# Patient Record
Sex: FEMALE | Employment: OTHER | URBAN - METROPOLITAN AREA
[De-identification: names, ages, dates, MRNs, and addresses within clinical notes are randomized per-mention and may not be internally consistent; named-entity substitution may affect disease eponyms.]

---

## 2017-06-24 ENCOUNTER — HOSPITAL ENCOUNTER (EMERGENCY)
Facility: CLINIC | Age: 62
Discharge: HOME | End: 2017-06-24
Attending: EMERGENCY MEDICINE | Admitting: EMERGENCY MEDICINE
Payer: MEDICARE

## 2017-06-24 ENCOUNTER — APPOINTMENT (OUTPATIENT)
Dept: GENERAL RADIOLOGY | Facility: CLINIC | Age: 62
End: 2017-06-24
Attending: EMERGENCY MEDICINE
Payer: MEDICARE

## 2017-06-24 VITALS
HEART RATE: 64 BPM | RESPIRATION RATE: 16 BRPM | OXYGEN SATURATION: 95 % | BODY MASS INDEX: 43.4 KG/M2 | WEIGHT: 293 LBS | SYSTOLIC BLOOD PRESSURE: 119 MMHG | HEIGHT: 69 IN | TEMPERATURE: 98.6 F | DIASTOLIC BLOOD PRESSURE: 93 MMHG

## 2017-06-24 DIAGNOSIS — R06.02 SOB (SHORTNESS OF BREATH): ICD-10-CM

## 2017-06-24 LAB
ALBUMIN SERPL-MCNC: 3.1 G/DL (ref 3.4–5)
ALP SERPL-CCNC: 97 U/L (ref 40–150)
ALT SERPL W P-5'-P-CCNC: 23 U/L (ref 0–50)
ANION GAP SERPL CALCULATED.3IONS-SCNC: 5 MMOL/L (ref 3–14)
AST SERPL W P-5'-P-CCNC: 11 U/L (ref 0–45)
BASOPHILS # BLD AUTO: 0 10E9/L (ref 0–0.2)
BASOPHILS NFR BLD AUTO: 0.2 %
BILIRUB SERPL-MCNC: 0.3 MG/DL (ref 0.2–1.3)
BUN SERPL-MCNC: 22 MG/DL (ref 7–30)
CALCIUM SERPL-MCNC: 8.8 MG/DL (ref 8.5–10.1)
CHLORIDE SERPL-SCNC: 108 MMOL/L (ref 94–109)
CO2 SERPL-SCNC: 25 MMOL/L (ref 20–32)
CREAT SERPL-MCNC: 1.28 MG/DL (ref 0.52–1.04)
D DIMER PPP FEU-MCNC: 0.4 UG/ML FEU (ref 0–0.5)
DIFFERENTIAL METHOD BLD: ABNORMAL
EOSINOPHIL # BLD AUTO: 0.2 10E9/L (ref 0–0.7)
EOSINOPHIL NFR BLD AUTO: 2.2 %
ERYTHROCYTE [DISTWIDTH] IN BLOOD BY AUTOMATED COUNT: 15.3 % (ref 10–15)
GFR SERPL CREATININE-BSD FRML MDRD: 42 ML/MIN/1.7M2
GLUCOSE SERPL-MCNC: 80 MG/DL (ref 70–99)
HCT VFR BLD AUTO: 36.9 % (ref 35–47)
HGB BLD-MCNC: 12.1 G/DL (ref 11.7–15.7)
IMM GRANULOCYTES # BLD: 0.2 10E9/L (ref 0–0.4)
IMM GRANULOCYTES NFR BLD: 2.1 %
LIPASE SERPL-CCNC: 160 U/L (ref 73–393)
LYMPHOCYTES # BLD AUTO: 1.8 10E9/L (ref 0.8–5.3)
LYMPHOCYTES NFR BLD AUTO: 21.9 %
MCH RBC QN AUTO: 32.2 PG (ref 26.5–33)
MCHC RBC AUTO-ENTMCNC: 32.8 G/DL (ref 31.5–36.5)
MCV RBC AUTO: 98 FL (ref 78–100)
MONOCYTES # BLD AUTO: 0.8 10E9/L (ref 0–1.3)
MONOCYTES NFR BLD AUTO: 9.8 %
NEUTROPHILS # BLD AUTO: 5.2 10E9/L (ref 1.6–8.3)
NEUTROPHILS NFR BLD AUTO: 63.8 %
NRBC # BLD AUTO: 0 10*3/UL
NRBC BLD AUTO-RTO: 1 /100
PLATELET # BLD AUTO: 231 10E9/L (ref 150–450)
POTASSIUM SERPL-SCNC: 4.5 MMOL/L (ref 3.4–5.3)
PROT SERPL-MCNC: 7.3 G/DL (ref 6.8–8.8)
RBC # BLD AUTO: 3.76 10E12/L (ref 3.8–5.2)
SODIUM SERPL-SCNC: 138 MMOL/L (ref 133–144)
TROPONIN I SERPL-MCNC: NORMAL UG/L (ref 0–0.04)
WBC # BLD AUTO: 8.1 10E9/L (ref 4–11)

## 2017-06-24 PROCEDURE — 71020 XR CHEST 2 VW: CPT

## 2017-06-24 PROCEDURE — 84484 ASSAY OF TROPONIN QUANT: CPT | Performed by: EMERGENCY MEDICINE

## 2017-06-24 PROCEDURE — 85379 FIBRIN DEGRADATION QUANT: CPT | Performed by: EMERGENCY MEDICINE

## 2017-06-24 PROCEDURE — 83690 ASSAY OF LIPASE: CPT | Performed by: EMERGENCY MEDICINE

## 2017-06-24 PROCEDURE — 80053 COMPREHEN METABOLIC PANEL: CPT | Performed by: EMERGENCY MEDICINE

## 2017-06-24 PROCEDURE — 85025 COMPLETE CBC W/AUTO DIFF WBC: CPT | Performed by: EMERGENCY MEDICINE

## 2017-06-24 PROCEDURE — 99284 EMERGENCY DEPT VISIT MOD MDM: CPT | Mod: 25

## 2017-06-24 ASSESSMENT — ENCOUNTER SYMPTOMS
COUGH: 0
FEVER: 0
BACK PAIN: 1
SHORTNESS OF BREATH: 1

## 2017-06-24 NOTE — ED NOTES
Patient requested to have RN call and cancel her [employer] ride scheduled for this afternoon. Writer did so at 1300.

## 2017-06-24 NOTE — ED AVS SNAPSHOT
Emergency Department    [address] [place] [address]    Phone:  [phone]    Fax:  [phone]                                       Mila Kumar   MRN: [id number]    Department:   Emergency Department   Date of Visit:  6/24/2017           After Visit Summary Signature Page     I have received my discharge instructions, and my questions have been answered. I have discussed any challenges I see with this plan with the nurse or doctor.    ..........................................................................................................................................  Patient/Patient Representative Signature      ..........................................................................................................................................  Patient Representative Print Name and Relationship to Patient    ..................................................               ................................................  Date                                            Time    ..........................................................................................................................................  Reviewed by Signature/Title    ...................................................              ..............................................  Date                                                            Time

## 2017-06-24 NOTE — ED NOTES
Bed: ED15  Expected date: 6/24/17  Expected time: 12:01 PM  Means of arrival:   Comments:  89-50s F CP/SOB in a-fib

## 2017-06-24 NOTE — ED PROVIDER NOTES
History     Chief Complaint:  Shortness of Breath and Chest Pain       The history is provided by the patient.      Mila Kumar is a 62 year old female who presents via EMS with shortness of breath. She reports that she had trouble breathing today at around noon, which prompted her to contact EMS. She also states that she is experiencing back and jaw pain, as well as some swelling in her legs when she moves about. She contacted EMS and while en route had a short episode of chest pain which resolved on its own prior to arrival in the emergency department. She took painkillers last night before bed, but has had none today. She denies any fever, cough, or any other medical complaints.    CARDIAC RISK FACTORS:  Sex:    F  Tobacco:   Former smoker  Hypertension:   Neg  Hyperlipidemia:  Neg  Diabetes:   Pos  Family History:  Neg    PE/DVT RISK FACTORS:  Sex:    F  Hormones:   Neg  Tobacco:   Former smoker  Cancer:   Neg  Travel:   Neg  Surgery:   Neg  Other immobilization: Neg  Personal history:  Neg  Family history:  Neg     Allergies:  Contrast Dye  Sulfa Drugs      Medications:    Tylenol #3  Albuterol inh/neb  Allopurinol  Amlodipine  Aspirin 81 mg  Azelastine  Calcitrate  Fibercon  Vitamin D3  Catapres  Cyanocobalamin  Bentyl  Fergon  Flovent  Neurontin  Levothyroxine  Methocarbamol  Ditropan  Propranolol  Pyridoxine   Sennosides    Past Medical History:    DM  Gastric bypass for obesity  Sleep apnea    Past Surgical History:    GYN Surgery    Family History:    History reviewed. No pertinent family history.      Social History:  Presents via EMS   Tobacco use: Former smoker  Alcohol use: Negative  PCP: Coleen Sanabria    Marital Status:  Single    Review of Systems   Constitutional: Negative for fever.   HENT:        (+) Jaw pain   Respiratory: Positive for shortness of breath. Negative for cough.    Cardiovascular: Positive for chest pain and leg swelling.   Musculoskeletal: Positive for back  "pain.     10 point review of systems performed and is negative except as above and in HPI.     Physical Exam     Patient Vitals for the past 24 hrs:   BP Temp Temp src Pulse Heart Rate Resp SpO2 Height Weight   06/24/17 1400 (!) 119/93 - - - 67 16 - - -   06/24/17 1345 - - - - 70 18 - - -   06/24/17 1330 133/84 - - - 68 17 - - -   06/24/17 1306 - - - - - 21 95 % - -   06/24/17 1305 (!) 124/94 - - - - - (!) 88 % - -   06/24/17 1242 - - - - 62 8 97 % - -   06/24/17 1211 (!) 127/108 98.6  F (37  C) Oral 64 - 16 97 % 1.753 m (5' 9\") (!) 140.6 kg (310 lb)      Physical Exam  General: Resting on the gurney, appears somewhat drowsy but easily rousable  Head:  The scalp, face, and head appear normal  Mouth/Throat: Mucus membranes are moist  CV:  Regular rate and rhythm    Normal S1 and S2  No notable murmur   Resp:  Lungs clear to auscultation in all fields.      Non-labored, no retractions or accessory muscle use    No coarseness    No wheezing   GI:  Abdomen is soft, no rigidity    No tenderness to palpation  MS:  Normal motor assessment of all extremities.    Good capillary refill noted.    No lower extremity asymmetry, redness, or excess warmth.  Skin:  No rash or lesions noted.  Neuro: Speech is normal and fluent. No apparent deficit.  Psych:  Awake. Alert.  Normal affect.      Appropriate interactions.     Emergency Department Course   Imaging:  Radiographic findings were communicated with the patient who voiced understanding of the findings.    XR Chest, 2 views:  IMPRESSION: PA and lateral views of the chest. Lungs are hypoaerated but otherwise clear. Heart is normal in size. No effusions are evident. No pneumothorax.    Imaging independently reviewed and agree with radiologist interpretation.     Laboratory:  CBC: WNL (WBC 8.1, HGB 12.1, )   CMP: Creatinine 1.28 (H), GFR 42 (L), Albumin 3.1 (L) ow WNL    Lipase: 160   1221: Troponin: <0.015   D-dimer: 0.4    Emergency Department Course:  The patient arrived " in the emergency department via EMS.  Past medical records, nursing notes, and vitals reviewed.  1230: I performed an exam of the patient and obtained history as documented above.   Above workup undertaken.  I personally reviewed the laboratory results with the Patient and answered all related questions prior to discharge.    1352: I rechecked the patient.  Findings and plan explained to the Patient. Patient discharged home with instructions regarding supportive care, medications, and reasons to return. The importance of close follow-up was reviewed.      Impression & Plan    Medical Decision Making:  Mila Kumar is a 62 year old female who presents for evaluation of generalized weakness as well as shortness of breath.  Associated symptoms today have included a brief period of chest pain and back pain.  The workup here in the emergency room was to evaluate for infections, metabolic, electrolyte, neurologic, muscle or cardiovascular causes.  Of note, there are no signs of pneumonia causing the symptoms.  In addition, I do not believe symptoms are due to CVA, TIA, myasthenia gravis, myopathy or acute coronary syndromes and there are no indications at this point for a further workup with a benign history, exam and laboratory tests.  Doubt spinal pathology or other central etiology of symptoms. I believe she can safely discharged to home with supportive outpatient management; will have them followup with their primary care doctor in 1-2 days for a recheck. Return for any additional symptoms or worsening.       Diagnosis:    ICD-10-CM    1. SOB (shortness of breath) R06.02        Disposition:  Discharged to home with plan as outlined.    Ambrocio Tello  6/24/2017    EMERGENCY DEPARTMENT  I, Ambrocio Tello, am serving as a scribe at 12:30 PM on 6/24/2017 to document services personally performed by Carlee Smiley MD based on my observations and the provider's statements to me.       Carlee Smiley,  MD  06/25/17 0442

## 2017-06-24 NOTE — ED AVS SNAPSHOT
Emergency Department    [address] [facility] [address]    Phone:  [phone]    Fax:  [phone]                                       Mila Kumar   MRN: [id number]    Department:   Emergency Department   Date of Visit:  6/24/2017           Patient Information     Date Of Birth          1955        Your diagnoses for this visit were:     SOB (shortness of breath)        You were seen by Carlee Smiley MD.      Follow-up Information     Follow up with Coleen Sanabria MD. Schedule an appointment as soon as possible for a visit in 2 days.    Specialty:  Internal Medicine    Contact information:    [place] [facility]  [address] [place] [id number] [phone]          Follow up with  Emergency Department.    Specialty:  EMERGENCY MEDICINE    Why:  As needed, If symptoms worsen    Contact information:    [address] [facility] [address] [phone]        Discharge Instructions         Shortness of Breath (Dyspnea)  Shortness of breath is the feeling that you can't catch your breath or get enough air. It is also known as dyspnea.  Dyspnea can be caused by many different conditions. They include:    Acute asthma attack.    Worsening of chronic lung diseases such as chronic bronchitis and emphysema.    Heart failure. This is when weak heart muscle allows extra fluid to collect in the lungs.    Panic attacks or anxiety. Fear can cause rapid breathing (hyperventilation).    Pneumonia, or an infection in the lung tissue.    Exposure to toxic substances, fumes, smoke, or certain medicines.    Blood clot in the lung (pulmonary embolism). This is often from a piece of blood clot in a deep vein of the leg (deep vein thrombosis) that breaks off and travels to the lungs.    Heart attack or heart-related chest pain (angina).    Anemia.    Collapsed lung (pneumothorax).    Dehydration.    Pregnancy.  Based on your visit today, the  exact cause of your shortness of breath is not certain. Your tests don t show any of the serious causes of dyspnea. You may need other tests to find out if you have a serious problem. It s important to watch for any new symptoms or symptoms that get worse. Follow up with your healthcare provider as directed.  Home care  Follow these tips to take care of yourself at home:    When your symptoms are better, go back to your usual activities.    If you smoke, you should stop. Join a quit-smoking program or ask your healthcare provider for help.    Eat a healthy diet and get plenty of sleep.    Get regular exercise. Talk with your healthcare provider before starting to exercise, especially if you have other medical problems.    Cut down on the amount of caffeine and stimulants you consume.  Follow-up care  Follow up with your healthcare provider, or as advised.  If tests were done, you will be told if your treatment needs to be changed. You can call as directed for the results.  (Note: If an X-ray was taken, a specialist will review it. You will be notified of any new findings that may affect your care.)  Call 911 or get immediate medical care  Shortness of breath may be a sign of a serious medical problem. For example, it may be a problem with your heart or lungs. Call 911 if you have worsening shortness of breath or trouble breathing, especially with any of the symptoms below:    You are confused or it s difficult to wake you.    You faint or lose consciousness.    You have a fast heartbeat, or your heartbeat is irregular.    You are coughing up blood.    You have pain in your chest, arm, shoulder, neck, or upper back.    You break out in a sweat.  When to seek medical advice  Call your healthcare provider right away if any of these occur:    Slight shortness of breath or wheezing    Redness, pain or swelling in your leg, arm, or other body area    Swelling in both legs or ankles    Fast weight gain    Dizziness or  weakness    Fever of 100.4 F (38 C) or higher, or as directed by your healthcare provider  Date Last Reviewed: 9/13/2015 2000-2017 [employer]. [address] [place] [address], [place] [address]. All rights reserved. This information is not intended as a substitute for professional medical care. Always follow your healthcare professional's instructions.          24 Hour Appointment Hotline       To make an appointment at any [facility], call [phone] ([phone]). If you don't have a family doctor or clinic, we will help you find one. [facility] are conveniently located to serve the needs of you and your family.             Review of your medicines      Notice     You have not been prescribed any medications.            Procedures and tests performed during your visit     CBC with platelets differential    Comprehensive metabolic panel    D dimer quantitative    Lipase    Troponin I    XR Chest 2 Views      Orders Needing Specimen Collection     None      Pending Results     No orders found from 6/22/2017 to 6/25/2017.            Pending Culture Results     No orders found from 6/22/2017 to 6/25/2017.            Pending Results Instructions     If you had any lab results that were not finalized at the time of your Discharge, you can call the ED Lab Result RN at [phone]. You will be contacted by this team for any positive Lab results or changes in treatment. The nurses are available 7 days a week from 10A to 6:30P.  You can leave a message 24 hours per day and they will return your call.        Test Results From Your Hospital Stay        6/24/2017 12:53 PM      Component Results     Component Value Ref Range & Units Status    WBC 8.1 4.0 - 11.0 10e9/L Final    RBC Count 3.76 (L) 3.8 - 5.2 10e12/L Final    Hemoglobin 12.1 11.7 - 15.7 g/dL Final    Hematocrit 36.9 35.0 - 47.0 % Final    MCV 98 78 - 100 fl Final    MCH 32.2 26.5 - 33.0 pg Final    MCHC 32.8 31.5 - 36.5 g/dL Final    RDW  15.3 (H) 10.0 - 15.0 % Final    Platelet Count 231 150 - 450 10e9/L Final    Diff Method Automated Method  Final    % Neutrophils 63.8 % Final    % Lymphocytes 21.9 % Final    % Monocytes 9.8 % Final    % Eosinophils 2.2 % Final    % Basophils 0.2 % Final    % Immature Granulocytes 2.1 % Final    Nucleated RBCs 1 (H) 0 /100 Final    Absolute Neutrophil 5.2 1.6 - 8.3 10e9/L Final    Absolute Lymphocytes 1.8 0.8 - 5.3 10e9/L Final    Absolute Monocytes 0.8 0.0 - 1.3 10e9/L Final    Absolute Eosinophils 0.2 0.0 - 0.7 10e9/L Final    Absolute Basophils 0.0 0.0 - 0.2 10e9/L Final    Abs Immature Granulocytes 0.2 0 - 0.4 10e9/L Final    Absolute Nucleated RBC 0.0  Final         6/24/2017  1:06 PM      Component Results     Component Value Ref Range & Units Status    D Dimer 0.4 0.0 - 0.50 ug/ml FEU Final    This D-dimer assay is intended for use in conjuntion with a clinical pretest   probability assessment model to exclude pulmonary embolism (PE) and as an aid   in the diagnosis of deep venous thrombosis (DVT) in outpatients suspected of   PE   or DVT. The cut-off value is 0.5 g/mL FEU.           6/24/2017  1:13 PM      Component Results     Component Value Ref Range & Units Status    Sodium 138 133 - 144 mmol/L Final    Potassium 4.5 3.4 - 5.3 mmol/L Final    Chloride 108 94 - 109 mmol/L Final    Carbon Dioxide 25 20 - 32 mmol/L Final    Anion Gap 5 3 - 14 mmol/L Final    Glucose 80 70 - 99 mg/dL Final    Urea Nitrogen 22 7 - 30 mg/dL Final    Creatinine 1.28 (H) 0.52 - 1.04 mg/dL Final    GFR Estimate 42 (L) >60 mL/min/1.7m2 Final    Non  GFR Calc    GFR Estimate If Black 51 (L) >60 mL/min/1.7m2 Final    African American GFR Calc    Calcium 8.8 8.5 - 10.1 mg/dL Final    Bilirubin Total 0.3 0.2 - 1.3 mg/dL Final    Albumin 3.1 (L) 3.4 - 5.0 g/dL Final    Protein Total 7.3 6.8 - 8.8 g/dL Final    Alkaline Phosphatase 97 40 - 150 U/L Final    ALT 23 0 - 50 U/L Final    AST 11 0 - 45 U/L Final          6/24/2017  1:08 PM      Component Results     Component Value Ref Range & Units Status    Lipase 160 73 - 393 U/L Final         6/24/2017  1:13 PM      Component Results     Component Value Ref Range & Units Status    Troponin I ES  0.000 - 0.045 ug/L Final    <0.015  The 99th percentile for upper reference range is 0.045 ug/L.  Troponin values in   the range of 0.045 - 0.120 ug/L may be associated with risks of adverse   clinical events.           6/24/2017  1:20 PM      Narrative     CHEST TWO VIEWS  6/24/2017 12:58 PM     HISTORY: Shortness of breath.         Impression     IMPRESSION: PA and lateral views of the chest. Lungs are hypoaerated  but otherwise clear. Heart is normal in size. No effusions are  evident. No pneumothorax.    CLIVE GONZALEZ MD                Clinical Quality Measure: Blood Pressure Screening     Your blood pressure was checked while you were in the emergency department today. The last reading we obtained was  BP: (!) 124/94 . Please read the guidelines below about what these numbers mean and what you should do about them.  If your systolic blood pressure (the top number) is less than 120 and your diastolic blood pressure (the bottom number) is less than 80, then your blood pressure is normal. There is nothing more that you need to do about it.  If your systolic blood pressure (the top number) is 120-139 or your diastolic blood pressure (the bottom number) is 80-89, your blood pressure may be higher than it should be. You should have your blood pressure rechecked within a year by a primary care provider.  If your systolic blood pressure (the top number) is 140 or greater or your diastolic blood pressure (the bottom number) is 90 or greater, you may have high blood pressure. High blood pressure is treatable, but if left untreated over time it can put you at risk for heart attack, stroke, or kidney failure. You should have your blood pressure rechecked by a primary care provider within the  "next 4 weeks.  If your provider in the emergency department today gave you specific instructions to follow-up with your doctor or provider even sooner than that, you should follow that instruction and not wait for up to 4 weeks for your follow-up visit.        Thank you for choosing [facility]       Thank you for choosing [facility] for your care. Our goal is always to provide you with excellent care. Hearing back from our patients is one way we can continue to improve our services. Please take a few minutes to complete the written survey that you may receive in the mail after you visit with us. Thank you!        [employer] Information     [employer] lets you send messages to your doctor, view your test results, renew your prescriptions, schedule appointments and more. To sign up, go to [email or web address] . Click on \"Log in\" on the left side of the screen, which will take you to the Welcome page. Then click on \"Sign up Now\" on the right side of the page.     You will be asked to enter the access code listed below, as well as some personal information. Please follow the directions to create your username and password.     Your access code is: [email or web address]  Expires: 2017  1:58 PM     Your access code will  in 90 days. If you need help or a new code, please call your [facility] clinic or [phone].        Care EveryWhere ID     This is your Care EveryWhere ID. This could be used by other organizations to access your [facility] medical records  [id number]        Equal Access to Services     SRIDEVI LAMB AH: Hadii elías French, waaxda lujulienadaha, qaybta kaalmada natividad, arlin briceno. So [facility] [phone].    ATENCIÓN: Si habla español, tiene a joseph disposición servicios gratuitos de asistencia lingüística. Ciara al [phone].    We comply with applicable federal civil rights laws and [place] laws. We do not discriminate on the basis of race, color, national origin, age, " disability sex, sexual orientation or gender identity.            After Visit Summary       This is your record. Keep this with you and show to your community pharmacist(s) and doctor(s) at your next visit.

## 2017-06-24 NOTE — DISCHARGE INSTRUCTIONS
Shortness of Breath (Dyspnea)  Shortness of breath is the feeling that you can't catch your breath or get enough air. It is also known as dyspnea.  Dyspnea can be caused by many different conditions. They include:    Acute asthma attack.    Worsening of chronic lung diseases such as chronic bronchitis and emphysema.    Heart failure. This is when weak heart muscle allows extra fluid to collect in the lungs.    Panic attacks or anxiety. Fear can cause rapid breathing (hyperventilation).    Pneumonia, or an infection in the lung tissue.    Exposure to toxic substances, fumes, smoke, or certain medicines.    Blood clot in the lung (pulmonary embolism). This is often from a piece of blood clot in a deep vein of the leg (deep vein thrombosis) that breaks off and travels to the lungs.    Heart attack or heart-related chest pain (angina).    Anemia.    Collapsed lung (pneumothorax).    Dehydration.    Pregnancy.  Based on your visit today, the exact cause of your shortness of breath is not certain. Your tests don t show any of the serious causes of dyspnea. You may need other tests to find out if you have a serious problem. It s important to watch for any new symptoms or symptoms that get worse. Follow up with your healthcare provider as directed.  Home care  Follow these tips to take care of yourself at home:    When your symptoms are better, go back to your usual activities.    If you smoke, you should stop. Join a quit-smoking program or ask your healthcare provider for help.    Eat a healthy diet and get plenty of sleep.    Get regular exercise. Talk with your healthcare provider before starting to exercise, especially if you have other medical problems.    Cut down on the amount of caffeine and stimulants you consume.  Follow-up care  Follow up with your healthcare provider, or as advised.  If tests were done, you will be told if your treatment needs to be changed. You can call as directed for the results.  (Note:  If an X-ray was taken, a specialist will review it. You will be notified of any new findings that may affect your care.)  Call 911 or get immediate medical care  Shortness of breath may be a sign of a serious medical problem. For example, it may be a problem with your heart or lungs. Call 911 if you have worsening shortness of breath or trouble breathing, especially with any of the symptoms below:    You are confused or it s difficult to wake you.    You faint or lose consciousness.    You have a fast heartbeat, or your heartbeat is irregular.    You are coughing up blood.    You have pain in your chest, arm, shoulder, neck, or upper back.    You break out in a sweat.  When to seek medical advice  Call your healthcare provider right away if any of these occur:    Slight shortness of breath or wheezing    Redness, pain or swelling in your leg, arm, or other body area    Swelling in both legs or ankles    Fast weight gain    Dizziness or weakness    Fever of 100.4 F (38 C) or higher, or as directed by your healthcare provider  Date Last Reviewed: 9/13/2015 2000-2017 [employer] [address] [place] [address]. All rights reserved. This information is not intended as a substitute for professional medical care. Always follow your healthcare professional's instructions.

## 2020-08-28 DIAGNOSIS — Z11.59 ENCOUNTER FOR SCREENING FOR OTHER VIRAL DISEASES: Primary | ICD-10-CM

## 2020-09-17 ENCOUNTER — TRANSFERRED RECORDS (OUTPATIENT)
Dept: HEALTH INFORMATION MANAGEMENT | Facility: CLINIC | Age: 65
End: 2020-09-17

## 2020-10-09 DIAGNOSIS — Z11.59 ENCOUNTER FOR SCREENING FOR OTHER VIRAL DISEASES: ICD-10-CM

## 2020-10-09 PROCEDURE — U0003 INFECTIOUS AGENT DETECTION BY NUCLEIC ACID (DNA OR RNA); SEVERE ACUTE RESPIRATORY SYNDROME CORONAVIRUS 2 (SARS-COV-2) (CORONAVIRUS DISEASE [COVID-19]), AMPLIFIED PROBE TECHNIQUE, MAKING USE OF HIGH THROUGHPUT TECHNOLOGIES AS DESCRIBED BY CMS-2020-01-R: HCPCS | Mod: 90 | Performed by: PATHOLOGY

## 2020-10-09 PROCEDURE — 99000 SPECIMEN HANDLING OFFICE-LAB: CPT | Performed by: PATHOLOGY

## 2020-10-10 LAB
SARS-COV-2 RNA SPEC QL NAA+PROBE: NOT DETECTED
SPECIMEN SOURCE: NORMAL

## 2020-10-12 RX ORDER — FEXOFENADINE HCL 180 MG/1
180 TABLET ORAL DAILY PRN
COMMUNITY

## 2020-10-12 RX ORDER — CHOLECALCIFEROL (VITAMIN D3) 125 MCG
9000 CAPSULE ORAL 3 TIMES DAILY PRN
COMMUNITY

## 2020-10-12 RX ORDER — FERROUS GLUCONATE 324(37.5)
1 TABLET ORAL AT BEDTIME
COMMUNITY

## 2020-10-12 RX ORDER — IBUPROFEN 200 MG
0.5 CAPSULE ORAL 2 TIMES DAILY
COMMUNITY

## 2020-10-12 RX ORDER — ALBUTEROL SULFATE 90 UG/1
2 AEROSOL, METERED RESPIRATORY (INHALATION) EVERY 6 HOURS
COMMUNITY

## 2020-10-12 RX ORDER — METHOCARBAMOL 500 MG/1
500 TABLET, FILM COATED ORAL 2 TIMES DAILY PRN
COMMUNITY

## 2020-10-12 RX ORDER — FAMOTIDINE 20 MG/1
20 TABLET, FILM COATED ORAL 2 TIMES DAILY PRN
COMMUNITY

## 2020-10-12 RX ORDER — LIDOCAINE 50 MG/G
1 PATCH TOPICAL DAILY PRN
COMMUNITY

## 2020-10-12 RX ORDER — ONDANSETRON 4 MG/1
4 TABLET, FILM COATED ORAL EVERY 8 HOURS PRN
COMMUNITY

## 2020-10-12 RX ORDER — FLUTICASONE PROPIONATE 220 UG/1
1 AEROSOL, METERED RESPIRATORY (INHALATION) DAILY PRN
COMMUNITY

## 2020-10-12 RX ORDER — LANOLIN ALCOHOL/MO/W.PET/CERES
50 CREAM (GRAM) TOPICAL 3 TIMES DAILY
COMMUNITY

## 2020-10-12 RX ORDER — FUROSEMIDE 20 MG
20 TABLET ORAL EVERY MORNING
COMMUNITY
End: 2020-10-22

## 2020-10-12 RX ORDER — SENNOSIDES 8.6 MG
2 TABLET ORAL DAILY PRN
COMMUNITY

## 2020-10-12 RX ORDER — ALLOPURINOL 300 MG/1
300 TABLET ORAL EVERY MORNING
COMMUNITY

## 2020-10-12 RX ORDER — DULOXETIN HYDROCHLORIDE 60 MG/1
60 CAPSULE, DELAYED RELEASE ORAL 2 TIMES DAILY
COMMUNITY

## 2020-10-12 RX ORDER — GABAPENTIN 300 MG/1
900 CAPSULE ORAL AT BEDTIME
COMMUNITY

## 2020-10-12 RX ORDER — ALPRAZOLAM 0.5 MG
0.5 TABLET ORAL 3 TIMES DAILY PRN
Status: ON HOLD | COMMUNITY
End: 2020-10-17

## 2020-10-12 RX ORDER — ALBUTEROL SULFATE 0.83 MG/ML
2.5 SOLUTION RESPIRATORY (INHALATION) EVERY 4 HOURS PRN
COMMUNITY

## 2020-10-12 RX ORDER — ASPIRIN 81 MG/1
81 TABLET ORAL EVERY MORNING
COMMUNITY

## 2020-10-12 RX ORDER — TROSPIUM CHLORIDE 20 MG/1
20 TABLET, FILM COATED ORAL 2 TIMES DAILY
COMMUNITY

## 2020-10-12 RX ORDER — METOPROLOL SUCCINATE 50 MG/1
50 TABLET, EXTENDED RELEASE ORAL AT BEDTIME
COMMUNITY

## 2020-10-12 RX ORDER — ROSUVASTATIN CALCIUM 20 MG/1
20 TABLET, COATED ORAL EVERY MORNING
COMMUNITY

## 2020-10-12 RX ORDER — AZELASTINE 1 MG/ML
1 SPRAY, METERED NASAL DAILY PRN
COMMUNITY

## 2020-10-12 RX ORDER — LOPERAMIDE HYDROCHLORIDE 2 MG/1
2-4 TABLET ORAL 4 TIMES DAILY PRN
COMMUNITY

## 2020-10-12 RX ORDER — LEVOTHYROXINE SODIUM 112 UG/1
112 TABLET ORAL EVERY MORNING
COMMUNITY

## 2020-10-12 RX ORDER — TROLAMINE SALICYLATE 10 G/100G
CREAM TOPICAL 2 TIMES DAILY PRN
COMMUNITY

## 2020-10-12 RX ORDER — CHOLECALCIFEROL (VITAMIN D3) 50 MCG
1 TABLET ORAL EVERY MORNING
COMMUNITY

## 2020-10-12 RX ORDER — CYANOCOBALAMIN 1000 UG/ML
1 INJECTION, SOLUTION INTRAMUSCULAR; SUBCUTANEOUS
COMMUNITY

## 2020-10-12 RX ORDER — CLONIDINE HYDROCHLORIDE 0.1 MG/1
0.1 TABLET ORAL EVERY MORNING
COMMUNITY
End: 2020-10-22

## 2020-10-12 RX ORDER — AMLODIPINE BESYLATE 10 MG/1
5 TABLET ORAL EVERY MORNING
COMMUNITY
End: 2020-10-22

## 2020-10-12 RX ORDER — CLONIDINE HYDROCHLORIDE 0.1 MG/1
0.2 TABLET ORAL AT BEDTIME
COMMUNITY
End: 2020-10-22

## 2020-10-12 RX ORDER — FLUTICASONE PROPIONATE 50 MCG
2 SPRAY, SUSPENSION (ML) NASAL DAILY PRN
COMMUNITY

## 2020-10-12 NOTE — PROGRESS NOTES
PTA medications updated by Medication Scribe prior to surgery via phone call with patient      -LAST DOSES ENTERED BY NURSE-    Comments:    Medication history sources: Patient, H&P and Care Everywhere  Medication history source reliability: Good  Adherence assessment: N/A Not Observed    Significant changes made to the medication list:  Patient taking meds differently than prescribed; See PTA entries for: Flovent 220 mcg inhaler      Additional medication history information:   None        Prior to Admission medications    Medication Sig Last Dose Taking? Auth Provider   acetaminophen-codeine (TYLENOL #3) 300-30 MG tablet Take 1 tablet by mouth every 6 hours as needed for severe pain  at PRN Yes Reported, Patient   albuterol (PROAIR HFA/PROVENTIL HFA/VENTOLIN HFA) 108 (90 Base) MCG/ACT inhaler Inhale 2 puffs into the lungs every 6 hours  at PRN Yes Reported, Patient   albuterol (PROVENTIL) (2.5 MG/3ML) 0.083% neb solution Take 2.5 mg by nebulization every 4 hours as needed for shortness of breath / dyspnea or wheezing  at PRN Yes Reported, Patient   allopurinol (ZYLOPRIM) 300 MG tablet Take 300 mg by mouth every morning  at AM Yes Reported, Patient   ALPRAZolam (XANAX) 0.5 MG tablet Take 0.5 mg by mouth 3 times daily as needed for anxiety  at PRN Yes Reported, Patient   amLODIPine (NORVASC) 10 MG tablet Take 10 mg by mouth every morning  at AM Yes Reported, Patient   aspirin 81 MG EC tablet Take 81 mg by mouth every morning  at AM Yes Reported, Patient   azelastine (ASTELIN) 0.1 % nasal spray Spray 1 spray into both nostrils daily as needed for rhinitis or allergies  at PRN Yes Reported, Patient   calcium citrate (CITRACAL) 950 MG tablet Take 0.5 tablets by mouth 2 times daily  at HS Yes Reported, Patient   cloNIDine (CATAPRES) 0.1 MG tablet Take 0.1 mg by mouth every morning (in addition to 2 X 0.1 mg bedtime dose)  at AM Yes Reported, Patient   cloNIDine (CATAPRES) 0.1 MG tablet Take 0.2 mg by mouth At Bedtime (2  X 0.1mg)  (in addition to morning dose)  at HS Yes Reported, Patient   cyanocobalamin (CYANOCOBALAMIN) 1000 MCG/ML injection Inject 1 mL into the muscle every 30 days Over 2 weeks ago. at AM Yes Reported, Patient   DULoxetine (CYMBALTA) 60 MG capsule Take 60 mg by mouth 2 times daily  at AM Yes Reported, Patient   famotidine (PEPCID) 20 MG tablet Take 20 mg by mouth 2 times daily as needed  at PRN Yes Reported, Patient   Ferrous Gluconate 324 (37.5 Fe) MG TABS Take 1 tablet by mouth At Bedtime  at HS Yes Reported, Patient   fexofenadine (ALLEGRA) 180 MG tablet Take 180 mg by mouth daily as needed for allergies  at PRN Yes Reported, Patient   fluticasone (FLONASE) 50 MCG/ACT nasal spray Spray 2 sprays into both nostrils daily as needed for rhinitis or allergies  at PRN Yes Reported, Patient   fluticasone (FLOVENT HFA) 220 MCG/ACT inhaler Inhale 1 puff into the lungs daily as needed  at AM Yes Reported, Patient   furosemide (LASIX) 20 MG tablet Take 20 mg by mouth every morning  at AM Yes Reported, Patient   gabapentin (NEURONTIN) 300 MG capsule Take 900 mg by mouth At Bedtime (3 X 300mg)  at HS Yes Reported, Patient   lactase (LACTAID) 3000 UNIT tablet Take 9,000 Units by mouth 3 times daily as needed for indigestion  at PRN Yes Reported, Patient   levothyroxine (SYNTHROID/LEVOTHROID) 112 MCG tablet Take 112 mcg by mouth every morning  at AM Yes Reported, Patient   lidocaine (LIDODERM) 5 % patch Place 1 patch onto the skin daily as needed for moderate pain To prevent lidocaine toxicity, patient should be patch free for 12 hrs daily.  at PRN Yes Reported, Patient   loperamide (IMODIUM A-D) 2 MG tablet Take 2-4 mg by mouth 4 times daily as needed for diarrhea (max 8 tablets/24 hours)  at PRN Yes Reported, Patient   methocarbamol (ROBAXIN) 500 MG tablet Take 500 mg by mouth 2 times daily as needed for muscle spasms  at PRN Yes Reported, Patient   metoprolol succinate ER (TOPROL-XL) 50 MG 24 hr tablet Take 50 mg by mouth  At Bedtime  at HS Yes Reported, Patient   Multiple Vitamins-Minerals (WOMENS MULTI VITAMIN & MINERAL) TABS Take 1 tablet by mouth every morning  at AM Yes Reported, Patient   ondansetron (ZOFRAN) 4 MG tablet Take 4 mg by mouth every 8 hours as needed for nausea  at PRN Yes Reported, Patient   rosuvastatin (CRESTOR) 20 MG tablet Take 20 mg by mouth every morning  at AM Yes Reported, Patient   sennosides (SENOKOT) 8.6 MG tablet Take 2 tablets by mouth daily as needed for constipation  at PRN Yes Reported, Patient   trolamine salicylate (ASPERCREME) 10 % external cream Apply topically 2 times daily as needed for moderate pain  at AM Yes Reported, Patient   trospium (SANCTURA) 20 MG tablet Take 20 mg by mouth 2 times daily  at AM Yes Reported, Patient   vitamin B6 (PYRIDOXINE) 50 MG TABS Take 50 mg by mouth 3 times daily  at HS Yes Reported, Patient   vitamin D3 (CHOLECALCIFEROL) 50 mcg (2000 units) tablet Take 1 tablet by mouth every morning  at AM Yes Reported, Patient   melatonin 5 MG tablet Take 5 mg by mouth nightly as needed for sleep Have not started yet. at PRN  Reported, Patient

## 2020-10-13 ENCOUNTER — APPOINTMENT (OUTPATIENT)
Dept: GENERAL RADIOLOGY | Facility: CLINIC | Age: 65
DRG: 483 | End: 2020-10-13
Attending: ORTHOPAEDIC SURGERY
Payer: COMMERCIAL

## 2020-10-13 ENCOUNTER — HOSPITAL ENCOUNTER (INPATIENT)
Facility: CLINIC | Age: 65
LOS: 4 days | Discharge: NURSING, REHAB OR LONG-TERM CARE | DRG: 483 | End: 2020-10-17
Attending: ORTHOPAEDIC SURGERY | Admitting: ORTHOPAEDIC SURGERY
Payer: COMMERCIAL

## 2020-10-13 ENCOUNTER — ANESTHESIA (OUTPATIENT)
Dept: SURGERY | Facility: CLINIC | Age: 65
DRG: 483 | End: 2020-10-13
Payer: COMMERCIAL

## 2020-10-13 ENCOUNTER — ANESTHESIA EVENT (OUTPATIENT)
Dept: SURGERY | Facility: CLINIC | Age: 65
DRG: 483 | End: 2020-10-13
Payer: COMMERCIAL

## 2020-10-13 DIAGNOSIS — F41.9 ANXIETY: ICD-10-CM

## 2020-10-13 DIAGNOSIS — Z96.611 S/P REVERSE TOTAL SHOULDER ARTHROPLASTY, RIGHT: Primary | ICD-10-CM

## 2020-10-13 LAB
CREAT SERPL-MCNC: 1.51 MG/DL (ref 0.52–1.04)
GFR SERPL CREATININE-BSD FRML MDRD: 36 ML/MIN/{1.73_M2}
GLUCOSE BLDC GLUCOMTR-MCNC: 113 MG/DL (ref 70–99)
GLUCOSE BLDC GLUCOMTR-MCNC: 122 MG/DL (ref 70–99)
GLUCOSE BLDC GLUCOMTR-MCNC: 97 MG/DL (ref 70–99)
GLUCOSE SERPL-MCNC: 109 MG/DL (ref 70–99)
HBA1C MFR BLD: 5.5 % (ref 0–5.6)
POTASSIUM SERPL-SCNC: 4.7 MMOL/L (ref 3.4–5.3)

## 2020-10-13 PROCEDURE — 761N000001 HC RECOVERY PHASE 1 LEVEL 1 FIRST HR: Performed by: ORTHOPAEDIC SURGERY

## 2020-10-13 PROCEDURE — 250N000011 HC RX IP 250 OP 636: Performed by: ORTHOPAEDIC SURGERY

## 2020-10-13 PROCEDURE — 93010 ELECTROCARDIOGRAM REPORT: CPT | Performed by: INTERNAL MEDICINE

## 2020-10-13 PROCEDURE — 93005 ELECTROCARDIOGRAM TRACING: CPT

## 2020-10-13 PROCEDURE — 250N000011 HC RX IP 250 OP 636: Performed by: NURSE ANESTHETIST, CERTIFIED REGISTERED

## 2020-10-13 PROCEDURE — 258N000003 HC RX IP 258 OP 636: Performed by: ORTHOPAEDIC SURGERY

## 2020-10-13 PROCEDURE — 370N000002 HC ANESTHESIA TECHNICAL FEE, EACH ADDTL 15 MIN: Performed by: ORTHOPAEDIC SURGERY

## 2020-10-13 PROCEDURE — 250N000009 HC RX 250: Performed by: ORTHOPAEDIC SURGERY

## 2020-10-13 PROCEDURE — 36415 COLL VENOUS BLD VENIPUNCTURE: CPT | Performed by: SURGERY

## 2020-10-13 PROCEDURE — 120N000001 HC R&B MED SURG/OB

## 2020-10-13 PROCEDURE — 360N000027 HC SURGERY LEVEL 4 EA 15 ADDTL MIN: Performed by: ORTHOPAEDIC SURGERY

## 2020-10-13 PROCEDURE — 360N000026 HC SURGERY LEVEL 4 1ST 30 MIN: Performed by: ORTHOPAEDIC SURGERY

## 2020-10-13 PROCEDURE — 258N000003 HC RX IP 258 OP 636: Performed by: SURGERY

## 2020-10-13 PROCEDURE — 271N000001 HC OR GENERAL SUPPLY NON-STERILE: Performed by: ORTHOPAEDIC SURGERY

## 2020-10-13 PROCEDURE — 82947 ASSAY GLUCOSE BLOOD QUANT: CPT | Performed by: SURGERY

## 2020-10-13 PROCEDURE — 999N000063 XR SHOULDER RT PORT G/E 2 VW: Mod: RT

## 2020-10-13 PROCEDURE — C1776 JOINT DEVICE (IMPLANTABLE): HCPCS | Performed by: ORTHOPAEDIC SURGERY

## 2020-10-13 PROCEDURE — 84132 ASSAY OF SERUM POTASSIUM: CPT | Performed by: SURGERY

## 2020-10-13 PROCEDURE — 36415 COLL VENOUS BLD VENIPUNCTURE: CPT | Performed by: NURSE PRACTITIONER

## 2020-10-13 PROCEDURE — 99222 1ST HOSP IP/OBS MODERATE 55: CPT | Performed by: NURSE PRACTITIONER

## 2020-10-13 PROCEDURE — 0RRJ00Z REPLACEMENT OF RIGHT SHOULDER JOINT WITH REVERSE BALL AND SOCKET SYNTHETIC SUBSTITUTE, OPEN APPROACH: ICD-10-PCS | Performed by: ORTHOPAEDIC SURGERY

## 2020-10-13 PROCEDURE — 999N000140 HC STATISTIC PRE-PROCEDURE ASSESSMENT III: Performed by: ORTHOPAEDIC SURGERY

## 2020-10-13 PROCEDURE — 250N000013 HC RX MED GY IP 250 OP 250 PS 637: Performed by: ORTHOPAEDIC SURGERY

## 2020-10-13 PROCEDURE — 272N000001 HC OR GENERAL SUPPLY STERILE: Performed by: ORTHOPAEDIC SURGERY

## 2020-10-13 PROCEDURE — 999N001017 HC STATISTIC GLUCOSE BY METER IP

## 2020-10-13 PROCEDURE — C1713 ANCHOR/SCREW BN/BN,TIS/BN: HCPCS | Performed by: ORTHOPAEDIC SURGERY

## 2020-10-13 PROCEDURE — 370N000001 HC ANESTHESIA TECHNICAL FEE, 1ST 30 MIN: Performed by: ORTHOPAEDIC SURGERY

## 2020-10-13 PROCEDURE — 250N000009 HC RX 250: Performed by: NURSE ANESTHETIST, CERTIFIED REGISTERED

## 2020-10-13 PROCEDURE — 83036 HEMOGLOBIN GLYCOSYLATED A1C: CPT | Performed by: NURSE PRACTITIONER

## 2020-10-13 PROCEDURE — 250N000011 HC RX IP 250 OP 636: Performed by: ANESTHESIOLOGY

## 2020-10-13 PROCEDURE — 258N000001 HC RX 258: Performed by: ORTHOPAEDIC SURGERY

## 2020-10-13 PROCEDURE — 250N000003 HC SEVOFLURANE, EA 15 MIN: Performed by: ORTHOPAEDIC SURGERY

## 2020-10-13 PROCEDURE — 99207 PR CONSULT E&M CHANGED TO INITIAL LEVEL: CPT | Performed by: NURSE PRACTITIONER

## 2020-10-13 PROCEDURE — 82565 ASSAY OF CREATININE: CPT | Performed by: SURGERY

## 2020-10-13 PROCEDURE — 250N000013 HC RX MED GY IP 250 OP 250 PS 637: Performed by: NURSE PRACTITIONER

## 2020-10-13 DEVICE — DELTA XTEND LOCKING METAGLENE SCREW DIA 4.5 LG 36MM
Type: IMPLANTABLE DEVICE | Site: SHOULDER | Status: FUNCTIONAL
Brand: DELTA XTEND

## 2020-10-13 RX ORDER — ONDANSETRON 4 MG/1
4 TABLET, ORALLY DISINTEGRATING ORAL EVERY 30 MIN PRN
Status: DISCONTINUED | OUTPATIENT
Start: 2020-10-13 | End: 2020-10-13 | Stop reason: HOSPADM

## 2020-10-13 RX ORDER — MAGNESIUM HYDROXIDE 1200 MG/15ML
LIQUID ORAL PRN
Status: DISCONTINUED | OUTPATIENT
Start: 2020-10-13 | End: 2020-10-13 | Stop reason: HOSPADM

## 2020-10-13 RX ORDER — SODIUM CHLORIDE, SODIUM LACTATE, POTASSIUM CHLORIDE, CALCIUM CHLORIDE 600; 310; 30; 20 MG/100ML; MG/100ML; MG/100ML; MG/100ML
INJECTION, SOLUTION INTRAVENOUS CONTINUOUS
Status: DISCONTINUED | OUTPATIENT
Start: 2020-10-13 | End: 2020-10-13 | Stop reason: HOSPADM

## 2020-10-13 RX ORDER — NALOXONE HYDROCHLORIDE 0.4 MG/ML
.1-.4 INJECTION, SOLUTION INTRAMUSCULAR; INTRAVENOUS; SUBCUTANEOUS
Status: ACTIVE | OUTPATIENT
Start: 2020-10-13 | End: 2020-10-14

## 2020-10-13 RX ORDER — CHOLECALCIFEROL (VITAMIN D3) 50 MCG
50 TABLET ORAL EVERY MORNING
Status: DISCONTINUED | OUTPATIENT
Start: 2020-10-14 | End: 2020-10-17 | Stop reason: HOSPADM

## 2020-10-13 RX ORDER — PROPOFOL 10 MG/ML
INJECTION, EMULSION INTRAVENOUS PRN
Status: DISCONTINUED | OUTPATIENT
Start: 2020-10-13 | End: 2020-10-13

## 2020-10-13 RX ORDER — ALBUTEROL SULFATE 90 UG/1
2 AEROSOL, METERED RESPIRATORY (INHALATION) EVERY 6 HOURS PRN
Status: DISCONTINUED | OUTPATIENT
Start: 2020-10-13 | End: 2020-10-17 | Stop reason: HOSPADM

## 2020-10-13 RX ORDER — ONDANSETRON 2 MG/ML
4 INJECTION INTRAMUSCULAR; INTRAVENOUS EVERY 6 HOURS PRN
Status: DISCONTINUED | OUTPATIENT
Start: 2020-10-13 | End: 2020-10-17 | Stop reason: HOSPADM

## 2020-10-13 RX ORDER — TRANEXAMIC ACID 10 MG/ML
1 INJECTION, SOLUTION INTRAVENOUS ONCE
Status: COMPLETED | OUTPATIENT
Start: 2020-10-13 | End: 2020-10-13

## 2020-10-13 RX ORDER — AZELASTINE 1 MG/ML
1 SPRAY, METERED NASAL DAILY PRN
Status: DISCONTINUED | OUTPATIENT
Start: 2020-10-13 | End: 2020-10-17 | Stop reason: HOSPADM

## 2020-10-13 RX ORDER — HYDROMORPHONE HYDROCHLORIDE 1 MG/ML
.3-.5 INJECTION, SOLUTION INTRAMUSCULAR; INTRAVENOUS; SUBCUTANEOUS EVERY 5 MIN PRN
Status: DISCONTINUED | OUTPATIENT
Start: 2020-10-13 | End: 2020-10-13 | Stop reason: HOSPADM

## 2020-10-13 RX ORDER — SODIUM CHLORIDE, SODIUM LACTATE, POTASSIUM CHLORIDE, CALCIUM CHLORIDE 600; 310; 30; 20 MG/100ML; MG/100ML; MG/100ML; MG/100ML
INJECTION, SOLUTION INTRAVENOUS CONTINUOUS
Status: DISCONTINUED | OUTPATIENT
Start: 2020-10-13 | End: 2020-10-15

## 2020-10-13 RX ORDER — AMLODIPINE BESYLATE 10 MG/1
10 TABLET ORAL EVERY MORNING
Status: DISCONTINUED | OUTPATIENT
Start: 2020-10-14 | End: 2020-10-17 | Stop reason: HOSPADM

## 2020-10-13 RX ORDER — TRANEXAMIC ACID 10 MG/ML
1 INJECTION, SOLUTION INTRAVENOUS ONCE
Status: DISCONTINUED | OUTPATIENT
Start: 2020-10-13 | End: 2020-10-13

## 2020-10-13 RX ORDER — METOPROLOL SUCCINATE 50 MG/1
50 TABLET, EXTENDED RELEASE ORAL AT BEDTIME
Status: DISCONTINUED | OUTPATIENT
Start: 2020-10-13 | End: 2020-10-17 | Stop reason: HOSPADM

## 2020-10-13 RX ORDER — ONDANSETRON 2 MG/ML
INJECTION INTRAMUSCULAR; INTRAVENOUS PRN
Status: DISCONTINUED | OUTPATIENT
Start: 2020-10-13 | End: 2020-10-13

## 2020-10-13 RX ORDER — HYDROMORPHONE HYDROCHLORIDE 1 MG/ML
0.2 INJECTION, SOLUTION INTRAMUSCULAR; INTRAVENOUS; SUBCUTANEOUS
Status: DISCONTINUED | OUTPATIENT
Start: 2020-10-13 | End: 2020-10-17 | Stop reason: HOSPADM

## 2020-10-13 RX ORDER — FLUTICASONE PROPIONATE 50 MCG
2 SPRAY, SUSPENSION (ML) NASAL DAILY PRN
Status: DISCONTINUED | OUTPATIENT
Start: 2020-10-13 | End: 2020-10-17 | Stop reason: HOSPADM

## 2020-10-13 RX ORDER — CEFAZOLIN SODIUM 1 G/3ML
1 INJECTION, POWDER, FOR SOLUTION INTRAMUSCULAR; INTRAVENOUS SEE ADMIN INSTRUCTIONS
Status: DISCONTINUED | OUTPATIENT
Start: 2020-10-13 | End: 2020-10-13 | Stop reason: HOSPADM

## 2020-10-13 RX ORDER — MULTIPLE VITAMINS W/ MINERALS TAB 9MG-400MCG
1 TAB ORAL EVERY MORNING
Status: DISCONTINUED | OUTPATIENT
Start: 2020-10-14 | End: 2020-10-17 | Stop reason: HOSPADM

## 2020-10-13 RX ORDER — ALPRAZOLAM 0.5 MG
0.5 TABLET ORAL 3 TIMES DAILY PRN
Status: DISCONTINUED | OUTPATIENT
Start: 2020-10-13 | End: 2020-10-13

## 2020-10-13 RX ORDER — CYANOCOBALAMIN 1000 UG/ML
1000 INJECTION, SOLUTION INTRAMUSCULAR; SUBCUTANEOUS
Status: DISCONTINUED | OUTPATIENT
Start: 2020-10-13 | End: 2020-10-13

## 2020-10-13 RX ORDER — TOLTERODINE TARTRATE 1 MG/1
2 TABLET, EXTENDED RELEASE ORAL 2 TIMES DAILY
Status: DISCONTINUED | OUTPATIENT
Start: 2020-10-14 | End: 2020-10-17 | Stop reason: HOSPADM

## 2020-10-13 RX ORDER — DOCUSATE SODIUM 100 MG/1
100 CAPSULE, LIQUID FILLED ORAL 2 TIMES DAILY PRN
Status: DISCONTINUED | OUTPATIENT
Start: 2020-10-13 | End: 2020-10-17 | Stop reason: HOSPADM

## 2020-10-13 RX ORDER — POLYETHYLENE GLYCOL 3350 17 G/17G
17 POWDER, FOR SOLUTION ORAL DAILY PRN
Status: DISCONTINUED | OUTPATIENT
Start: 2020-10-13 | End: 2020-10-17 | Stop reason: HOSPADM

## 2020-10-13 RX ORDER — HYDROMORPHONE HYDROCHLORIDE 2 MG/1
2 TABLET ORAL EVERY 4 HOURS PRN
Status: DISCONTINUED | OUTPATIENT
Start: 2020-10-13 | End: 2020-10-17 | Stop reason: HOSPADM

## 2020-10-13 RX ORDER — HYDROMORPHONE HYDROCHLORIDE 1 MG/ML
0.4 INJECTION, SOLUTION INTRAMUSCULAR; INTRAVENOUS; SUBCUTANEOUS
Status: DISCONTINUED | OUTPATIENT
Start: 2020-10-13 | End: 2020-10-17 | Stop reason: HOSPADM

## 2020-10-13 RX ORDER — GLYCOPYRROLATE 0.2 MG/ML
INJECTION, SOLUTION INTRAMUSCULAR; INTRAVENOUS PRN
Status: DISCONTINUED | OUTPATIENT
Start: 2020-10-13 | End: 2020-10-13

## 2020-10-13 RX ORDER — ONDANSETRON 4 MG/1
4 TABLET, FILM COATED ORAL EVERY 8 HOURS PRN
Status: DISCONTINUED | OUTPATIENT
Start: 2020-10-13 | End: 2020-10-17 | Stop reason: HOSPADM

## 2020-10-13 RX ORDER — ONDANSETRON 2 MG/ML
4 INJECTION INTRAMUSCULAR; INTRAVENOUS EVERY 30 MIN PRN
Status: DISCONTINUED | OUTPATIENT
Start: 2020-10-13 | End: 2020-10-13 | Stop reason: HOSPADM

## 2020-10-13 RX ORDER — FENTANYL CITRATE 50 UG/ML
50 INJECTION, SOLUTION INTRAMUSCULAR; INTRAVENOUS
Status: DISCONTINUED | OUTPATIENT
Start: 2020-10-13 | End: 2020-10-13 | Stop reason: HOSPADM

## 2020-10-13 RX ORDER — PYRIDOXINE HCL (VITAMIN B6) 50 MG
50 TABLET ORAL 3 TIMES DAILY
Status: DISCONTINUED | OUTPATIENT
Start: 2020-10-13 | End: 2020-10-17 | Stop reason: HOSPADM

## 2020-10-13 RX ORDER — AMOXICILLIN 250 MG
1 CAPSULE ORAL 2 TIMES DAILY
Status: DISCONTINUED | OUTPATIENT
Start: 2020-10-13 | End: 2020-10-13

## 2020-10-13 RX ORDER — DULOXETIN HYDROCHLORIDE 60 MG/1
60 CAPSULE, DELAYED RELEASE ORAL 2 TIMES DAILY
Status: DISCONTINUED | OUTPATIENT
Start: 2020-10-13 | End: 2020-10-17 | Stop reason: HOSPADM

## 2020-10-13 RX ORDER — FENTANYL CITRATE-0.9 % NACL/PF 10 MCG/ML
PLASTIC BAG, INJECTION (ML) INTRAVENOUS CONTINUOUS PRN
Status: DISCONTINUED | OUTPATIENT
Start: 2020-10-13 | End: 2020-10-13

## 2020-10-13 RX ORDER — FENTANYL CITRATE 50 UG/ML
INJECTION, SOLUTION INTRAMUSCULAR; INTRAVENOUS PRN
Status: DISCONTINUED | OUTPATIENT
Start: 2020-10-13 | End: 2020-10-13

## 2020-10-13 RX ORDER — CHOLECALCIFEROL (VITAMIN D3) 125 MCG
9000 CAPSULE ORAL 3 TIMES DAILY PRN
Status: DISCONTINUED | OUTPATIENT
Start: 2020-10-13 | End: 2020-10-17 | Stop reason: HOSPADM

## 2020-10-13 RX ORDER — GABAPENTIN 300 MG/1
900 CAPSULE ORAL AT BEDTIME
Status: DISCONTINUED | OUTPATIENT
Start: 2020-10-13 | End: 2020-10-17 | Stop reason: HOSPADM

## 2020-10-13 RX ORDER — LEVOTHYROXINE SODIUM 112 UG/1
112 TABLET ORAL
Status: DISCONTINUED | OUTPATIENT
Start: 2020-10-14 | End: 2020-10-17 | Stop reason: HOSPADM

## 2020-10-13 RX ORDER — DEXTROSE MONOHYDRATE 25 G/50ML
25-50 INJECTION, SOLUTION INTRAVENOUS
Status: DISCONTINUED | OUTPATIENT
Start: 2020-10-13 | End: 2020-10-17 | Stop reason: HOSPADM

## 2020-10-13 RX ORDER — ONDANSETRON 4 MG/1
4 TABLET, ORALLY DISINTEGRATING ORAL EVERY 6 HOURS PRN
Status: DISCONTINUED | OUTPATIENT
Start: 2020-10-13 | End: 2020-10-17 | Stop reason: HOSPADM

## 2020-10-13 RX ORDER — NICOTINE POLACRILEX 4 MG
15-30 LOZENGE BUCCAL
Status: DISCONTINUED | OUTPATIENT
Start: 2020-10-13 | End: 2020-10-17 | Stop reason: HOSPADM

## 2020-10-13 RX ORDER — LIDOCAINE 40 MG/G
CREAM TOPICAL
Status: DISCONTINUED | OUTPATIENT
Start: 2020-10-13 | End: 2020-10-17 | Stop reason: HOSPADM

## 2020-10-13 RX ORDER — METHOCARBAMOL 500 MG/1
500 TABLET, FILM COATED ORAL 2 TIMES DAILY PRN
Status: DISCONTINUED | OUTPATIENT
Start: 2020-10-13 | End: 2020-10-17 | Stop reason: HOSPADM

## 2020-10-13 RX ORDER — LIDOCAINE HYDROCHLORIDE 20 MG/ML
INJECTION, SOLUTION INFILTRATION; PERINEURAL PRN
Status: DISCONTINUED | OUTPATIENT
Start: 2020-10-13 | End: 2020-10-13

## 2020-10-13 RX ORDER — BISACODYL 10 MG
10 SUPPOSITORY, RECTAL RECTAL DAILY PRN
Status: DISCONTINUED | OUTPATIENT
Start: 2020-10-13 | End: 2020-10-17 | Stop reason: HOSPADM

## 2020-10-13 RX ORDER — ROSUVASTATIN CALCIUM 20 MG/1
20 TABLET, COATED ORAL EVERY MORNING
Status: DISCONTINUED | OUTPATIENT
Start: 2020-10-14 | End: 2020-10-17 | Stop reason: HOSPADM

## 2020-10-13 RX ORDER — FERROUS GLUCONATE 324(38)MG
324 TABLET ORAL AT BEDTIME
Status: DISCONTINUED | OUTPATIENT
Start: 2020-10-13 | End: 2020-10-17 | Stop reason: HOSPADM

## 2020-10-13 RX ORDER — NEOSTIGMINE METHYLSULFATE 1 MG/ML
VIAL (ML) INJECTION PRN
Status: DISCONTINUED | OUTPATIENT
Start: 2020-10-13 | End: 2020-10-13

## 2020-10-13 RX ORDER — FEXOFENADINE HCL 180 MG/1
180 TABLET ORAL DAILY PRN
Status: DISCONTINUED | OUTPATIENT
Start: 2020-10-13 | End: 2020-10-17 | Stop reason: HOSPADM

## 2020-10-13 RX ORDER — FENTANYL CITRATE 50 UG/ML
25-50 INJECTION, SOLUTION INTRAMUSCULAR; INTRAVENOUS
Status: DISCONTINUED | OUTPATIENT
Start: 2020-10-13 | End: 2020-10-13 | Stop reason: HOSPADM

## 2020-10-13 RX ORDER — CLONIDINE HYDROCHLORIDE 0.1 MG/1
0.2 TABLET ORAL AT BEDTIME
Status: DISCONTINUED | OUTPATIENT
Start: 2020-10-13 | End: 2020-10-17 | Stop reason: HOSPADM

## 2020-10-13 RX ORDER — FUROSEMIDE 20 MG
20 TABLET ORAL EVERY MORNING
Status: DISCONTINUED | OUTPATIENT
Start: 2020-10-14 | End: 2020-10-17 | Stop reason: HOSPADM

## 2020-10-13 RX ORDER — PROPOFOL 10 MG/ML
INJECTION, EMULSION INTRAVENOUS CONTINUOUS PRN
Status: DISCONTINUED | OUTPATIENT
Start: 2020-10-13 | End: 2020-10-13

## 2020-10-13 RX ORDER — CLONIDINE HYDROCHLORIDE 0.1 MG/1
0.1 TABLET ORAL EVERY MORNING
Status: DISCONTINUED | OUTPATIENT
Start: 2020-10-14 | End: 2020-10-17 | Stop reason: HOSPADM

## 2020-10-13 RX ORDER — CEFAZOLIN SODIUM IN 0.9 % NACL 3 G/100 ML
3 INTRAVENOUS SOLUTION, PIGGYBACK (ML) INTRAVENOUS
Status: DISCONTINUED | OUTPATIENT
Start: 2020-10-13 | End: 2020-10-13 | Stop reason: HOSPADM

## 2020-10-13 RX ORDER — CALCIUM CARBONATE 500 MG/1
1000 TABLET, CHEWABLE ORAL EVERY 4 HOURS PRN
Status: DISCONTINUED | OUTPATIENT
Start: 2020-10-13 | End: 2020-10-17 | Stop reason: HOSPADM

## 2020-10-13 RX ORDER — ALBUTEROL SULFATE 0.83 MG/ML
2.5 SOLUTION RESPIRATORY (INHALATION) EVERY 4 HOURS PRN
Status: DISCONTINUED | OUTPATIENT
Start: 2020-10-13 | End: 2020-10-17 | Stop reason: HOSPADM

## 2020-10-13 RX ORDER — SENNOSIDES 8.6 MG
2 TABLET ORAL DAILY PRN
Status: DISCONTINUED | OUTPATIENT
Start: 2020-10-13 | End: 2020-10-17 | Stop reason: HOSPADM

## 2020-10-13 RX ORDER — LOPERAMIDE HCL 2 MG
2-4 CAPSULE ORAL 4 TIMES DAILY PRN
Status: DISCONTINUED | OUTPATIENT
Start: 2020-10-13 | End: 2020-10-17 | Stop reason: HOSPADM

## 2020-10-13 RX ORDER — PROCHLORPERAZINE MALEATE 5 MG
5 TABLET ORAL EVERY 6 HOURS PRN
Status: DISCONTINUED | OUTPATIENT
Start: 2020-10-13 | End: 2020-10-17 | Stop reason: HOSPADM

## 2020-10-13 RX ORDER — POLYETHYLENE GLYCOL 3350 17 G/17G
17 POWDER, FOR SOLUTION ORAL DAILY
Status: DISCONTINUED | OUTPATIENT
Start: 2020-10-14 | End: 2020-10-13

## 2020-10-13 RX ORDER — IBUPROFEN 200 MG
950 CAPSULE ORAL AT BEDTIME
Status: DISCONTINUED | OUTPATIENT
Start: 2020-10-13 | End: 2020-10-17 | Stop reason: HOSPADM

## 2020-10-13 RX ORDER — ALBUTEROL SULFATE 90 UG/1
2 AEROSOL, METERED RESPIRATORY (INHALATION) EVERY 6 HOURS
Status: DISCONTINUED | OUTPATIENT
Start: 2020-10-13 | End: 2020-10-13

## 2020-10-13 RX ORDER — DOCUSATE SODIUM 100 MG/1
100 CAPSULE, LIQUID FILLED ORAL 2 TIMES DAILY
Status: DISCONTINUED | OUTPATIENT
Start: 2020-10-13 | End: 2020-10-13

## 2020-10-13 RX ORDER — ALLOPURINOL 300 MG/1
300 TABLET ORAL EVERY MORNING
Status: DISCONTINUED | OUTPATIENT
Start: 2020-10-14 | End: 2020-10-17 | Stop reason: HOSPADM

## 2020-10-13 RX ORDER — HYDROMORPHONE HYDROCHLORIDE 2 MG/1
4 TABLET ORAL EVERY 4 HOURS PRN
Status: DISCONTINUED | OUTPATIENT
Start: 2020-10-13 | End: 2020-10-17 | Stop reason: HOSPADM

## 2020-10-13 RX ORDER — TROSPIUM CHLORIDE 20 MG/1
20 TABLET, FILM COATED ORAL 2 TIMES DAILY
Status: DISCONTINUED | OUTPATIENT
Start: 2020-10-13 | End: 2020-10-13

## 2020-10-13 RX ADMIN — LIDOCAINE HYDROCHLORIDE 60 MG: 20 INJECTION, SOLUTION INFILTRATION; PERINEURAL at 10:46

## 2020-10-13 RX ADMIN — CLONIDINE HYDROCHLORIDE 0.2 MG: 0.1 TABLET ORAL at 21:19

## 2020-10-13 RX ADMIN — PROPOFOL 200 MG: 10 INJECTION, EMULSION INTRAVENOUS at 10:46

## 2020-10-13 RX ADMIN — DULOXETINE HYDROCHLORIDE 60 MG: 60 CAPSULE, DELAYED RELEASE ORAL at 20:04

## 2020-10-13 RX ADMIN — TRANEXAMIC ACID 1 G: 10 INJECTION, SOLUTION INTRAVENOUS at 12:30

## 2020-10-13 RX ADMIN — CALCIUM CARBONATE (ANTACID) CHEW TAB 500 MG 1000 MG: 500 CHEW TAB at 20:04

## 2020-10-13 RX ADMIN — SODIUM CHLORIDE, POTASSIUM CHLORIDE, SODIUM LACTATE AND CALCIUM CHLORIDE: 600; 310; 30; 20 INJECTION, SOLUTION INTRAVENOUS at 15:48

## 2020-10-13 RX ADMIN — ONDANSETRON 4 MG: 2 INJECTION INTRAMUSCULAR; INTRAVENOUS at 11:02

## 2020-10-13 RX ADMIN — ROPIVACAINE HYDROCHLORIDE 20 ML GIVEN: 5 INJECTION, SOLUTION EPIDURAL; INFILTRATION; PERINEURAL at 10:15

## 2020-10-13 RX ADMIN — ASPIRIN 325 MG: 325 TABLET, COATED ORAL at 17:37

## 2020-10-13 RX ADMIN — Medication 2 G: at 10:47

## 2020-10-13 RX ADMIN — GABAPENTIN 900 MG: 300 CAPSULE ORAL at 21:20

## 2020-10-13 RX ADMIN — ROCURONIUM BROMIDE 50 MG: 10 INJECTION INTRAVENOUS at 10:45

## 2020-10-13 RX ADMIN — FENTANYL CITRATE 25 MCG: 50 INJECTION, SOLUTION INTRAMUSCULAR; INTRAVENOUS at 12:40

## 2020-10-13 RX ADMIN — SODIUM CHLORIDE, POTASSIUM CHLORIDE, SODIUM LACTATE AND CALCIUM CHLORIDE: 600; 310; 30; 20 INJECTION, SOLUTION INTRAVENOUS at 12:36

## 2020-10-13 RX ADMIN — FENTANYL CITRATE 25 MCG: 50 INJECTION, SOLUTION INTRAMUSCULAR; INTRAVENOUS at 11:56

## 2020-10-13 RX ADMIN — VANCOMYCIN HYDROCHLORIDE 1500 MG: 5 INJECTION, POWDER, LYOPHILIZED, FOR SOLUTION INTRAVENOUS at 21:29

## 2020-10-13 RX ADMIN — HYDROMORPHONE HYDROCHLORIDE 0.4 MG: 1 INJECTION, SOLUTION INTRAMUSCULAR; INTRAVENOUS; SUBCUTANEOUS at 20:05

## 2020-10-13 RX ADMIN — METHOCARBAMOL 500 MG: 500 TABLET ORAL at 21:21

## 2020-10-13 RX ADMIN — TRANEXAMIC ACID 1 G: 10 INJECTION, SOLUTION INTRAVENOUS at 11:10

## 2020-10-13 RX ADMIN — METOPROLOL SUCCINATE 50 MG: 50 TABLET, EXTENDED RELEASE ORAL at 21:20

## 2020-10-13 RX ADMIN — Medication 10 MCG/MIN: at 11:17

## 2020-10-13 RX ADMIN — FENTANYL CITRATE 50 MCG: 50 INJECTION, SOLUTION INTRAMUSCULAR; INTRAVENOUS at 10:46

## 2020-10-13 RX ADMIN — PROPOFOL 75 MCG/KG/MIN: 10 INJECTION, EMULSION INTRAVENOUS at 10:53

## 2020-10-13 RX ADMIN — GLYCOPYRROLATE 0.4 MG: 0.2 INJECTION, SOLUTION INTRAMUSCULAR; INTRAVENOUS at 12:41

## 2020-10-13 RX ADMIN — SODIUM CHLORIDE, POTASSIUM CHLORIDE, SODIUM LACTATE AND CALCIUM CHLORIDE: 600; 310; 30; 20 INJECTION, SOLUTION INTRAVENOUS at 10:05

## 2020-10-13 RX ADMIN — NEOSTIGMINE METHYLSULFATE 3 MG: 1 INJECTION, SOLUTION INTRAVENOUS at 12:41

## 2020-10-13 RX ADMIN — SODIUM CHLORIDE, POTASSIUM CHLORIDE, SODIUM LACTATE AND CALCIUM CHLORIDE: 600; 310; 30; 20 INJECTION, SOLUTION INTRAVENOUS at 10:38

## 2020-10-13 ASSESSMENT — ACTIVITIES OF DAILY LIVING (ADL)
ADLS_ACUITY_SCORE: 17
ADLS_ACUITY_SCORE: 17

## 2020-10-13 ASSESSMENT — MIFFLIN-ST. JEOR: SCORE: 1914.38

## 2020-10-13 NOTE — ANESTHESIA CARE TRANSFER NOTE
Patient: Mila Kumar    Procedure(s):  RIGHT REVERSE TOTAL SHOULDER ARTHROPLASTY    Diagnosis: Arthritis of right shoulder region [M19.011]  Diagnosis Additional Information: No value filed.    Anesthesia Type:   General     Note:  Airway :Face Mask  Patient transferred to:PACU  Comments: Neuromuscular blockade reversed after TOF 4/4, spontaneous respirations, adequate tidal volumes, followed commands to voice, oropharynx suctioned with soft flexible catheter, extubated atraumatically, extubated with suction, airway patent after extubation.  Oxygen via facemask at 8 liters per minute to PACU. Oxygen tubing connected to wall O2 in PACU, SpO2, NiBP, and EKG monitors and alarms on and functioning, Sabrina Hugger warmer connected to patient gown, report on patient's clinical status given to PACU RN.   Handoff Report: Identifed the Patient, Identified the Reponsible Provider, Reviewed the pertinent medical history, Discussed the surgical course, Reviewed Intra-OP anesthesia mangement and issues during anesthesia, Set expectations for post-procedure period and Allowed opportunity for questions and acknowledgement of understanding      Vitals: (Last set prior to Anesthesia Care Transfer)    CRNA VITALS  10/13/2020 1230 - 10/13/2020 1309      10/13/2020             Pulse:  79    SpO2:  94 %    Resp Rate (observed):  10                Electronically Signed By: CAROLINA Worthington CRNA  October 13, 2020  1:09 PM

## 2020-10-13 NOTE — ANESTHESIA PROCEDURE NOTES
Airway   Date/Time: 10/13/2020 10:48 AM   Patient location during procedure: OR    Staff -   CRNA: Dalton Vega APRN CRNA  Performed By: CRNA    Consent for Airway   Urgency: elective    Indications and Patient Condition  Indications for airway management: delfina-procedural  Mask difficulty assessment: 1 - vent by mask    Final Airway Details  Final airway type: endotracheal airway  Successful airway:ETT - single and Oral  Endotracheal Airway Details   ETT size (mm): 7.0  Cuffed: yes  Successful intubation technique: direct laryngoscopy  Grade View of Cords: 1  Adjucts: stylet  Measured from: lips  Secured at (cm): 22  Secured with: plastic tape  Bite block used: None    Post intubation assessment   Placement verified by: capnometry, equal breath sounds and chest rise   Number of attempts at approach: 1  Secured with:plastic tape  Ease of procedure: easy  Dentition: Intact and Unchanged

## 2020-10-13 NOTE — ANESTHESIA PROCEDURE NOTES
Procedure note : Interscalene      Staff -   Anesthesiologist:  Marion Carmichael  Performed By: Anesthesiologist  Pre-Procedure  Performed by Marion Carmichael  Location: pre-op      Pre-Anesthestic Checklist: patient identified, IV checked, site marked, risks and benefits discussed, informed consent, monitors and equipment checked, pre-op evaluation, at physician/surgeon's request and post-op pain management    Timeout  Correct Patient: Yes   Correct Procedure: Yes   Correct Site: Yes   Correct Laterality: Yes   Correct Position: Yes   Site Marked: Yes   .   Procedure Documentation    .    Procedure: Interscalene, .   Patient Position:sitting Local skin infiltrated with 3 mL of 1% lidocaine.    Ultrasound used to identify targeted nerve, plexus, or vascular marker and placed a needle adjacent to it., Ultrasound was used to visualize the spread of the anesthetic in close proximity to the above stated nerve. A permanent image is entered into the patient's record.  Patient Prep/Sterile Barriers; mask, sterile gloves, chlorhexidine gluconate and isopropyl alcohol.  .  Needle: insulated, short bevel   Needle Gauge: 21.  Needle Length (millimeters) 50  Insertion Method: Single Shot.        Assessment/Narrative  Paresthesias: No.  Injection made incrementally with aspirations every 3 mL..  The placement was negative for: blood aspirated, painful injection and site bleeding.  Bolus given via needle..   Secured via.   Complications: none. Test dose of mL at. Test dose negative for signs of intravascular, subdural or intrathecal injection. Comments:  Real-time U/S used to visualize brachial plexus at interscalene level and to place needle adjacent to C5-C6 nerve roots.    Easy injection 20 cc total 0.5% ropivacaine with 1:400 K Epinephrine.    No paresthesias or signs/symptoms IV injection.   Pt tolerated well.  No complications.      Ultrasound Interpretation, Peripheral Nerve Block    1. Under ultrasound  guidance, the needle was inserted and placed in close proximity to the target nerve(s).  2. Ultrasound was also used to visualize the spread of the anesthetic in close proximity to the nerve(s) being blocked.  Local anesthetic was administered in incremental doses, with intermittent negative aspiration.    3. The nerve(s) appeared anatomically normal.  4. There were no apparent abnormal pathological findings.  5. A permanent ultrasound image was saved in the patient's record.    The surgeon has given a verbal order transferring care of this patient to me for the performance of a regional analgesia block for post-op pain control. It is requested of me because I am uniquely trained and qualified to perform this block and the surgeon is neither trained nor qualified to perform this procedure.

## 2020-10-13 NOTE — OP NOTE
Operative Note  10/13/20  ORTHOPEDIC SURGERY  Gurmeet Pacheco MD      OPERATIVE NOTE     Pre-Op Diagnosis: Right   shoulder Cuff Tear Insufficiency    Post-Op Diagnosis: Same    Procedure(s): Right   Reverse Total Shoulder Arthroplasty    Indications: Pain, Limitation of function; failure of non surgical management    Anesthesia: General endotrachial anesthesia and Regional Nerve Block-Interscalene brachial plexus    Implants: Depuy Delta Extend:  Metaglene; 42 +0 Glenosphere; 14 Humeral Stem; 2 Centered epiphysis; 42 x +9 Humeral Cup;  4  screws    Surgeon(s) and Role:  * Gurmeet Pacheco MD - Primary  * SEN Valladares  - Assist    EBL: 125 cc    Specimens:  None for culture    Complications: none noted    Disposition: PACU    HPI:   Mila Kumar is a pleasant 66 yo female with a 6-12 month history of progressive right shoulder pain and dysfunction.  She has managed her right shoulder non surgically to date with extensive non operative measures including activity modification, NSAIDS, Narcotic Pain medications, Cortisone injections and therapy.  Despite all measures, she continues to be incapacitated by her shoulder.  She is physically disabled secondary to her back and legs and requires use of her upper extremities for mobility and ADL's.  We discussed further non surgical vs surgical options and she wished to proceed with surgery. Surgery to include a right reverse total shoulder arthroplasty.  We discussed the nature of surgery including the Risks, Benefits, and alternatives.  These include but are not inclusive of: Wound healing problems, infection, potential injury to neurovascular structures and subsequent dysfunction, transfusion, failure of the hardware, and potential need for future surgery.  We discussed post operative expectations and long term expectations. The patient understands and wishes to proceed.        OPERATIVE PROCEDURE:    The patient was brought to the operating room. The  patient was carefully transferred to the operating table and adequate General anesthesia was obtained. A preoperative scalene block was performed by anesthesia in the [facility]. The patient was carefully placed in beach chair position utilizing the universal headrest system. The Right  shoulder was prepped and draped in the usual sterile fashion. Appropriate antibiotic was given to the patient preoperatively within 1 hour of procedure. Preoperative time-out was performed, confirming the Right  shoulder and appropriate procedure. The Surgeon's silvestre was noted. SEN Valladares's services were necessary during the entirety of the case due the complexity and nature of the case.   A Deltopectoral approach was utilized exposing down through the Deltopectoral interval.  The Cephalic vein was identified, retracted medially, the Deltoid was retracted laterally, exposing the Clavipectoral fascia.  The fascia was incised just lateral to the Conjoined tendon.  A Kobel retractor was placed anterior and a Brown retractor was placed over the Humeral head.  The Rotator Cuff was inspected and found to be chronically torn and retracted.  The Subscapularis was tagged with two number one Vicryl sutures, released sub periosteally  and then reflected medially.  The capsule was released circumferentially. The shoulder was dislocated.    The Humeral canal was established with a T-handle awl.  The canal was then sequentially reamed with diaphyseal reamers to a size 14 reamer.  The proximal Humeral cutting jig was then applied and a metaphyseal cut was performed.  The metaphyseal protector was then placed.  We then exposed the Glenoid. Utilizing the appropriate jig, the Glenoid pin was appropriately placed. Then Glenoid was then reamed concentrically down to cortical bleeding bone.  The superior rim was rasped with the hand rasp.  The Central guide hole was then established with the appropriate drill.  The real metaglene was then impacted into  place obtaining excellent fixation.  Two supero-inferior compression screws and two amy-posterior compression screws were then placed.  Trial Glenospheres were placed and the 42 Glenosphere fit well.  A trial 42 x +6 Glenosphere was placed for initial trilling.    The proximal Humerus was then once again visualized. The epiphysis was measured to a size 2 epiphysis.  The proximal Humerus was then reamed with a size 2 Centered reamer.  A trial component was fashioned on the back table.  The proximal Humerus was rasped with the appropriate sized rasp in appropriate rotation.  The trial stem was placed down the Humeral canal in appropriate rotation.  Trial Humeral cups were placed.The 42 x +9 Humeral cup gave the best fit, tension and stability.  With the shoudler reduced, we then placed it through a range of motion:   Passive elevation to 160, external rotation to 60, internal rotation to the  were noted with good stability throughout.  The lift off test with  the arm at the side was negative with good stability  of the components.    The Shoulder was once again dislocated an the trial components were removed.  The canal was thoroughly irrigated.  Provisional #2 fiberwire sutures were placed through the lessor tuberosity, looped through the canal, and out through the lessor tuberosity for later Subscapularis repair.     The Biceps tendon was tenontomized at its origin on the superior Glenoid rim.  The intra-articular portion was excised.  The Tendon was then tenodesed to the Pectoralis Major tendon with a #2 fiberwire utilizing a modified Hydro stitch.  Excellent repair was obtained.   The real Humeral component was fashioned on the back table in standard fashion.  A real 42 x +0 Glenosphere was then impacted into place and the central guide pin tightened.  The real Humeral component was then impacted into the proximal Humerus in uncemented fashion and in appropriate rotation.  The shoulder was reduced and  placed through a range of motion and found to be stable throughout its range of motion.  The Subscapularis was repaired back to the Lessor Tuberosity with the previously placed #2 fiberwire sutures.   The superficial tissues were thoroughly irrigated with pulse lavage.  The Deltopectoral interval was laid back in its normal position.  The skin was closed with a 2-0 Stratofix suture.  Steri strips were applied.  Sterile dressings were applied with a Tegaderm.    The arm was placed in an abductor sling and the patient was carefully transferred back to their hospital bed and to the PACU in stable condition.    Post Op Plan of Care:   Abductor sling x 4 wks; Sling for additional 2 wks; Elbow / Wrist / Hand Range of motion exercises 5-6 times per day while in sling.  Recheck appointment in 10-14 days with new films.  Passive tabletop stretch and pendulums at follow up appointment.  Formal PT to start beginning week 5.      Gurmeet Pacheco  [facility]

## 2020-10-13 NOTE — CONSULTS
[facility]  Consult Note - Hospitalist Service     Date of Admission:  10/13/2020  Consult Requested by: Dr. Pacheco  Reason for Consult: Medical Management     Assessment & Plan   Mila Kumar is a 65 year old female admitted on 10/13/2020. She presents for elective right total shoulder arthroplasty secondary to complete rotator cuff tear.  Past medical history includes well-controlled hypertension, GERD, anemia secondary to iron and B12 deficiencies, morbid obesity, mild intermittent asthma, stage III CKD, NEDRA, diastolic CHF and history of lacunar infarction without residual deficit.    Right total shoulder arthroplasty 2/2 complete rotator cuff tear  Surgery completed on 10/13 by Dr. Gurmeet Partida under general anesthesia.  There were no surgical complications noted, EBL 125cc, 1100cc crystalloid administered intraoperatively.  --Defer postoperative management to primary surgical team including pain management, disposition, therapies  --She has chronic diarrhea, and requests discontinuation of all laxatives.  I did discuss the risks of constipation with narcotic use and counseled the patient to start 1 of the stool softeners postop day 1 if she does not have diarrhea on postop day 1.  --pharmcoprophylaxis of vancomycin x 2      Hypertension  BP well managed on amlodipine, Lasix, losartan, metoprolol XL, clonidine.  BP in the postoperative period has been 115-130s/70s, heart rate 80s. Mention of diastolic dysfunction in her electronic record, most recent echocardiogram in 9/2017 showing normal LVEF size and function of 55%, LVH, no valvulopathy.  Diastolic function was unable to be assessed.  Of note, the patient did have a negative nuc med stress test in September 2017. She dnies any chest pain, exertional symptoms.   --Telemetry x24 hours in the postoperative period  --hold amlodipine, lasix, restart metop and clonidine with parameters     CKD stage III  With proteinuria,  has followed with Dr. López of nephrology in the past. Baseline creatinine 1.3-1.5. Patient discusses using aleve at home - counseled regarding avoiding NSAIDS due to her CKD.   --avoid nephrotoxic meds  --BMP tomorrow AM     Diabetes mellitus, type II with neuropathy  Diet/exercise controlled.  Most recent hemoglobin A1c 5.3% on 6/9/2020.  --Monitor glucose 4 times daily in the postoperative period with low correction coverage  --Continue gabapentin    Open wound on right great toe   She typically follows with Dr. Reno of podiatry.  I evaluated the wound on her great toe, there is no purulent drainage and tissue appeared healthy, antibiotics are not indicated.  --Monitor for infection, including erythema, swelling, pain, purulent drainage     NEDRA  Did not tolerate BiPAP/CPAP due to discomfort, she is now on 2L supplemental oxygen at night.  --Continuous pulse oximetry night  --High risk for hypoventilation syndrome in the setting of morbid obesity, underlying NEDRA and narcotic use postoperative pain    Anxiety; MDD  Has followed with psychiatry at [facility] in the past for potential PTSD.  Anxiety now well controlled on Cymbalta, and PRN Xanax for panic attacks.  --Hold Xanax in the setting of narcotic use to prevent oversedation    Hypothyroidism  Recent TSH 4.59 on 9/2020  --Continue levothyroxine    Anemia 2/2 iron and B12 deficiencies  Baseline hemoglobin 10-11.  Receives cyanocobalamin injections every 30 days.  --Pt prefers to hold PTA supplements     Mild intermittent asthma; allergies  Well-controlled on Flovent and as needed albuterol.  Typically triggered by allergies.  --Continue PTA Flovent, Astelin, Allegra and as needed albuterol    Overactive bladder  Typically has urinary frequency and urgency, follows with Dr. Thornton of urology.   --Bladder scan as needed  --Continue PTA [place] starting tomorrow (has strong anticholinergic properties and general anesthesia and narcotics may  "exacerbate side effects (dry mouth) and increase chance of delirium)    Functional disability 2/2 Charcot joints  Typically uses a motorized wheelchair outside of her home due to her joints, is able to walk up a flight of stairs before stopping to catch her breath.  --PT/OT    Morbid obesity s/p gastric bypass, 1991, Hx of compulsive overeating disorder  BMI greater than 40, has followed with the [facility].  She has been demonstrating continued weight loss. Has a hx of dysphasia in the past with \"eating large amounts of food\", counseled to eat small amounts.   --Monitor    GERD  Well-controlled on Pepcid, recently ran out is been having increased heartburn.  Reports she is prone to nausea after eating.  --Antiemetics available, continue Pepcid    Polyarticular gout  Followed by Dr. Brannon with rheumatology.  Gout is maintained on allopurinol.  Occasionally takes prednisone burst packs due to flareups.   --Continue allopurinol    Hx of multiple lacunar infarcts  Incidentally noted on MRI brain August 2019.  Aspirin 81 mg held 7 days prior to surgery.  --ASA restarted per surgery, continue Crestor    Myofascial pain syndrome  Followed by  with PM&R and receiving trigger point injections.    Advance care planning  Patient does like to be full code, but notes to her PCP she does not want to be \" dependent upon life support long-term if there is little chance of meaningful recovery\", states she has made her wishes known to her brother, Alfonzo, and Sister Lesley, who would be her surrogate decision makers.    The patient's care was discussed with the Attending Physician, Dr. Ryan Medellin, Bedside Nurse and Patient.    Valeria Patterson, [facility]    ______________________________________________________________________    Chief Complaint   Elective Right total shoulder arthroplasty    History is obtained from the patient    History of Present Illness   Mila Prieto " José is a 65 year old female who presents for elective right total shoulder arthroplasty secondary to complete rotator cuff tear.  Past medical history includes well-controlled hypertension, GERD, anemia secondary to iron and B12 deficiencies, morbid obesity, mild intermittent asthma, stage III CKD, NEDRA, diastolic CHF and history of lacunar infarction without residual deficit.    Right outpatient, conservative management.  The patient's right shoulder pain had progressed requiring surgical intervention.  Patient is postop day 0 from successful, uneventful right total shoulder arthroplasty with Dr. Pacheco.  Surgery completed under general anesthesia.     I evaluated the patient in the postoperative setting, she denies any ANTWAN V, chest pain is well controlled with the block.  Prior to surgery, the patient denies any recent chest pain, dyspnea, exertional symptoms, lower extremity swelling is at baseline, denies fever/chills, endorses occasional cough with dry mouth.  She suffers from urinary frequency and urgency and chronic diarrhea which is her baseline.    Review of Systems   The 10 point Review of Systems is negative other than noted in the HPI or here.     Past Medical History    I have reviewed this patient's medical history and updated it with pertinent information if needed.   Past Medical History:   Diagnosis Date     Abdominal hernia      Acne vulgaris      Allergic state      Anemia      Anorgasmia of female      Anxiety      B12 deficiency      Carpal tunnel syndrome      Charcot ankle      Charcot's joint disease due to secondary diabetes (H)      CHF (congestive heart failure) (H)      Chronic low back pain      Chronic, continuous use of opioids      Cubital tunnel syndrome      Diabetes (H)      Displacement of lumbar intervertebral disc without myelopathy      Dizziness      DJD (degenerative joint disease)      Dysphasia      Dysrhythmia      Eating disorder      Elevated PTHrP level       Encounter for long-term (current) use of medications      Enthesopathy of hip      Fatty liver      Ganglion cyst of wrist      Gastric bypass status for obesity 1994     GERD (gastroesophageal reflux disease)      Gout      Gouty arthropathy      Hearing loss      History of diabetes mellitus     Type II     History of lumbar surgery     L 4-5 for spondylolisthesis     History of right salpingo-oophorectomy     for right ovarian teratoma, ? bilateral     HTN (hypertension)      Hyperlipidemia      Hyperplastic polyp of intestine      Hypothyroidism      Hypoxemia      IBS (irritable bowel syndrome)      IFG (impaired fasting glucose)      Impingement syndrome of left shoulder      Ischemic cardiomyopathy      Lactose intolerance      Lacunar infarction (H)      Lateral epicondylitis of right elbow      Learning disorder     denied by patient     LVH (left ventricular hypertrophy)      Major depressive disorder, recurrent (H)      Microalbuminuria      Migraine      Mild intermittent asthma      Morbid obesity (H)      Multiple lacunar infarcts (H)      Myofascial pain syndrome      Nephrolithiasis      Neurogenic bladder      Neuropathy, peripheral      Nontoxic multinodular goiter      Onychomycosis      NEDRA (obstructive sleep apnea)      Pap smear abnormality of cervix      PCOS (polycystic ovarian syndrome)      Pes planus      Renal cyst, acquired, right      S/P gastric bypass      Simple chronic bronchitis (H)      Sleep apnea      Spinal stenosis, lumbar region without neurogenic claudication      Stage III chronic kidney disease      Tenosynovitis      TMJ dysfunction      Tobacco dependence in remission      Trochanteric bursitis of left hip      Vitamin D deficiency        Past Surgical History   I have reviewed this patient's surgical history and updated it with pertinent information if needed.  Past Surgical History:   Procedure Laterality Date     GI SURGERY      bariatric surgery 1994     GYN SURGERY        ORTHOPEDIC SURGERY      bilat carpal tunnel, right elbow nerve release       Social History   I have reviewed this patient's social history and updated it with pertinent information if needed.  Social History     Tobacco Use     Smoking status: Former Smoker     Packs/day: 2.00     Years: 22.00     Pack years: 44.00     Quit date: 10/13/1991     Years since quittin.0     Smokeless tobacco: Never Used   Substance Use Topics     Alcohol use: Not Currently     Drug use: No       Family History   I have reviewed this patient's family history and updated it with pertinent information if needed.   History reviewed. No pertinent family history.    Medications   I have reviewed this patient's current medications    Allergies   Allergies   Allergen Reactions     Augmentin [Amoxicillin-Pot Clavulanate]      Contrast Dye      Salsalate      Sulfa Drugs        Physical Exam   Vital Signs: Temp: 98.3  F (36.8  C) Temp src: Oral BP: 114/75 Pulse: 77   Resp: 16 SpO2: 93 % O2 Device: Nasal cannula Oxygen Delivery: 3 LPM  Weight: 288 lbs 12.84 oz    Constitutional: awake, alert, cooperative, no apparent distress, and appears stated age  Eyes: pupils equal, round and reactive to light and extra-ocular muscles intact  Respiratory: No increased work of breathing, good air exchange, clear to auscultation bilaterally, no crackles or wheezing  Cardiovascular: regular rate and rhythm, normal S1 and S2, no murmur noted and 1+ edema  GI: Soft, nontender, nondistended  Skin: Warm, dry.  On the patient's right foot great toe, there is a wound open wound.  Granulation tissue at the base, no erythema, purulent drainage or swelling.  Musculoskeletal: motor strength is 5 out of 5 all extremities bilaterally - with exception of RUE post operatively  Neuropsychiatric: General: normal, calm and normal eye contact  Level of consciousness: alert / normal  Affect: normal and pleasant    Data   I personally reviewed no images or EKG's today.

## 2020-10-13 NOTE — ANESTHESIA PREPROCEDURE EVALUATION
Anesthesia Pre-Procedure Evaluation    Patient: Mila Kumar   MRN: [id number] : 1955          Preoperative Diagnosis: Arthritis of right shoulder region [M19.011]    Procedure(s):  RIGHT REVERSE TOTAL SHOULDER ARTHROPLASTY    Past Medical History:   Diagnosis Date     Abdominal hernia      Acne vulgaris      Allergic state      Anemia      Anorgasmia of female      Anxiety      B12 deficiency      Carpal tunnel syndrome      Charcot ankle      Charcot's joint disease due to secondary diabetes (H)      CHF (congestive heart failure) (H)      Chronic low back pain      Chronic, continuous use of opioids      Cubital tunnel syndrome      Diabetes (H)      Displacement of lumbar intervertebral disc without myelopathy      Dizziness      DJD (degenerative joint disease)      Dysphasia      Dysrhythmia      Eating disorder      Elevated PTHrP level      Encounter for long-term (current) use of medications      Enthesopathy of hip      Fatty liver      Ganglion cyst of wrist      Gastric bypass status for obesity      GERD (gastroesophageal reflux disease)      Gout      Gouty arthropathy      Hearing loss      History of diabetes mellitus     Type II     History of lumbar surgery     L 4-5 for spondylolisthesis     History of right salpingo-oophorectomy     for right ovarian teratoma, ? bilateral     HTN (hypertension)      Hyperlipidemia      Hyperplastic polyp of intestine      Hypothyroidism      Hypoxemia      IBS (irritable bowel syndrome)      IFG (impaired fasting glucose)      Impingement syndrome of left shoulder      Ischemic cardiomyopathy      Lactose intolerance      Lacunar infarction (H)      Lateral epicondylitis of right elbow      Learning disorder     denied by patient     LVH (left ventricular hypertrophy)      Major depressive disorder, recurrent (H)      Microalbuminuria      Migraine      Mild intermittent asthma      Morbid obesity (H)      Multiple lacunar infarcts (H)       Myofascial pain syndrome      Nephrolithiasis      Neurogenic bladder      Neuropathy, peripheral      Nontoxic multinodular goiter      Onychomycosis      NEDRA (obstructive sleep apnea)      Pap smear abnormality of cervix      PCOS (polycystic ovarian syndrome)      Pes planus      Renal cyst, acquired, right      S/P gastric bypass      Simple chronic bronchitis (H)      Sleep apnea      Spinal stenosis, lumbar region without neurogenic claudication      Stage III chronic kidney disease      Tenosynovitis      TMJ dysfunction      Tobacco dependence in remission      Trochanteric bursitis of left hip      Vitamin D deficiency      Past Surgical History:   Procedure Laterality Date     GI SURGERY      bariatric surgery 1994     GYN SURGERY       ORTHOPEDIC SURGERY      bilat carpal tunnel, right elbow nerve release       Anesthesia Evaluation     .             ROS/MED HX    ENT/Pulmonary:     (+)sleep apnea, asthma uses CPAP , . .    Neurologic:     (+)migraines, CVA without deficits    Cardiovascular:     (+) hypertension----. : . CHF Last EF: 47 . . :. .       METS/Exercise Tolerance:     Hematologic:     (+) Anemia, -      Musculoskeletal:         GI/Hepatic:     (+) GERD liver disease,       Renal/Genitourinary:     (+) chronic renal disease, type: CRI, Pt has history of transplant,       Endo:     (+) type II DM thyroid problem hypothyroidism, Obesity, .      Psychiatric:     (+) psychiatric history anxiety and depression      Infectious Disease:         Malignancy:         Other:                          Physical Exam      Airway   Mallampati: III  TM distance: >3 FB  Neck ROM: full    Dental   (+) upper dentures and lower dentures    Cardiovascular   Rhythm and rate: regular      Pulmonary    breath sounds clear to auscultation            Lab Results   Component Value Date    WBC 8.1 06/24/2017    HGB 12.1 06/24/2017    HCT 36.9 06/24/2017     06/24/2017     06/24/2017    POTASSIUM 4.7  "10/13/2020    CHLORIDE 108 06/24/2017    CO2 25 06/24/2017    BUN 22 06/24/2017    CR 1.28 (H) 06/24/2017     (H) 10/13/2020    DONTRELL 8.8 06/24/2017    ALBUMIN 3.1 (L) 06/24/2017    PROTTOTAL 7.3 06/24/2017    ALT 23 06/24/2017    AST 11 06/24/2017    ALKPHOS 97 06/24/2017    BILITOTAL 0.3 06/24/2017    LIPASE 160 06/24/2017       Preop Vitals  BP Readings from Last 3 Encounters:   10/13/20 134/76   06/24/17 (!) 119/93    Pulse Readings from Last 3 Encounters:   10/13/20 87   06/24/17 64      Resp Readings from Last 3 Encounters:   10/13/20 16   06/24/17 16    SpO2 Readings from Last 3 Encounters:   10/13/20 97%   06/24/17 95%      Temp Readings from Last 1 Encounters:   10/13/20 36.7  C (98  F) (Temporal)    Ht Readings from Last 1 Encounters:   10/13/20 1.753 m (5' 9\")      Wt Readings from Last 1 Encounters:   10/13/20 131 kg (288 lb 12.8 oz)    Estimated body mass index is 42.65 kg/m  as calculated from the following:    Height as of this encounter: 1.753 m (5' 9\").    Weight as of this encounter: 131 kg (288 lb 12.8 oz).       Anesthesia Plan      History & Physical Review  History and physical reviewed and following examination; no interval change.    ASA Status:  3 .    NPO Status:  > 8 hours    Plan for General and Peripheral Nerve Block   PONV prophylaxis:  Ondansetron (or other 5HT-3) and Dexamethasone or Solumedrol         Postoperative Care      Consents  Anesthetic plan, risks, benefits and alternatives discussed with:  Patient..                 Marion Carmichael  "

## 2020-10-13 NOTE — ANESTHESIA POSTPROCEDURE EVALUATION
Patient: Mila Kumar    Procedure(s):  RIGHT REVERSE TOTAL SHOULDER ARTHROPLASTY    Diagnosis:Arthritis of right shoulder region [M19.011]  Diagnosis Additional Information: No value filed.    Anesthesia Type:  General    Note:  Anesthesia Post Evaluation    Patient location during evaluation: PACU  Patient participation: Able to fully participate in evaluation  Level of consciousness: awake, awake and alert and responsive to verbal stimuli  Pain management: adequate  Airway patency: patent  Cardiovascular status: acceptable  Respiratory status: acceptable  Hydration status: acceptable  PONV: none     Anesthetic complications: None          Last vitals:  Vitals:    10/13/20 1410 10/13/20 1430 10/13/20 1530   BP: 124/67 114/75 118/65   Pulse: 80 77 77   Resp: 17 16 19   Temp:  36.8  C (98.3  F)    SpO2: 94% 93% 95%         Electronically Signed By: Marion Carmichael  October 13, 2020  3:36 PM

## 2020-10-14 ENCOUNTER — APPOINTMENT (OUTPATIENT)
Dept: OCCUPATIONAL THERAPY | Facility: CLINIC | Age: 65
DRG: 483 | End: 2020-10-14
Attending: ORTHOPAEDIC SURGERY
Payer: COMMERCIAL

## 2020-10-14 LAB
ANION GAP SERPL CALCULATED.3IONS-SCNC: 2 MMOL/L (ref 3–14)
BUN SERPL-MCNC: 21 MG/DL (ref 7–30)
CALCIUM SERPL-MCNC: 8.8 MG/DL (ref 8.5–10.1)
CHLORIDE SERPL-SCNC: 107 MMOL/L (ref 94–109)
CO2 SERPL-SCNC: 28 MMOL/L (ref 20–32)
CREAT SERPL-MCNC: 1.35 MG/DL (ref 0.52–1.04)
GFR SERPL CREATININE-BSD FRML MDRD: 41 ML/MIN/{1.73_M2}
GLUCOSE BLDC GLUCOMTR-MCNC: 114 MG/DL (ref 70–99)
GLUCOSE BLDC GLUCOMTR-MCNC: 120 MG/DL (ref 70–99)
GLUCOSE BLDC GLUCOMTR-MCNC: 123 MG/DL (ref 70–99)
GLUCOSE BLDC GLUCOMTR-MCNC: 129 MG/DL (ref 70–99)
GLUCOSE SERPL-MCNC: 126 MG/DL (ref 70–99)
HGB BLD-MCNC: 10.1 G/DL (ref 11.7–15.7)
INTERPRETATION ECG - MUSE: NORMAL
POTASSIUM SERPL-SCNC: 4.9 MMOL/L (ref 3.4–5.3)
SODIUM SERPL-SCNC: 137 MMOL/L (ref 133–144)

## 2020-10-14 PROCEDURE — 250N000011 HC RX IP 250 OP 636: Performed by: ORTHOPAEDIC SURGERY

## 2020-10-14 PROCEDURE — 80048 BASIC METABOLIC PNL TOTAL CA: CPT | Performed by: ORTHOPAEDIC SURGERY

## 2020-10-14 PROCEDURE — 97530 THERAPEUTIC ACTIVITIES: CPT | Mod: GO | Performed by: OCCUPATIONAL THERAPIST

## 2020-10-14 PROCEDURE — 36415 COLL VENOUS BLD VENIPUNCTURE: CPT | Performed by: ORTHOPAEDIC SURGERY

## 2020-10-14 PROCEDURE — 97530 THERAPEUTIC ACTIVITIES: CPT | Mod: GO | Performed by: OCCUPATIONAL THERAPY ASSISTANT

## 2020-10-14 PROCEDURE — 97166 OT EVAL MOD COMPLEX 45 MIN: CPT | Mod: GO | Performed by: OCCUPATIONAL THERAPIST

## 2020-10-14 PROCEDURE — 250N000013 HC RX MED GY IP 250 OP 250 PS 637: Performed by: NURSE PRACTITIONER

## 2020-10-14 PROCEDURE — 97110 THERAPEUTIC EXERCISES: CPT | Mod: GO | Performed by: OCCUPATIONAL THERAPY ASSISTANT

## 2020-10-14 PROCEDURE — 120N000001 HC R&B MED SURG/OB

## 2020-10-14 PROCEDURE — 999N001017 HC STATISTIC GLUCOSE BY METER IP

## 2020-10-14 PROCEDURE — 250N000013 HC RX MED GY IP 250 OP 250 PS 637: Performed by: ORTHOPAEDIC SURGERY

## 2020-10-14 PROCEDURE — 99232 SBSQ HOSP IP/OBS MODERATE 35: CPT | Performed by: INTERNAL MEDICINE

## 2020-10-14 PROCEDURE — 85018 HEMOGLOBIN: CPT | Performed by: ORTHOPAEDIC SURGERY

## 2020-10-14 RX ADMIN — TOLTERODINE TARTRATE 2 MG: 1 TABLET, FILM COATED ORAL at 09:03

## 2020-10-14 RX ADMIN — CALCIUM CARBONATE (ANTACID) CHEW TAB 500 MG 1000 MG: 500 CHEW TAB at 19:49

## 2020-10-14 RX ADMIN — DULOXETINE HYDROCHLORIDE 60 MG: 60 CAPSULE, DELAYED RELEASE ORAL at 09:03

## 2020-10-14 RX ADMIN — ASPIRIN 325 MG: 325 TABLET, COATED ORAL at 09:03

## 2020-10-14 RX ADMIN — ROSUVASTATIN CALCIUM 20 MG: 20 TABLET, FILM COATED ORAL at 09:03

## 2020-10-14 RX ADMIN — METOPROLOL SUCCINATE 50 MG: 50 TABLET, EXTENDED RELEASE ORAL at 22:10

## 2020-10-14 RX ADMIN — CLONIDINE HYDROCHLORIDE 0.1 MG: 0.1 TABLET ORAL at 09:03

## 2020-10-14 RX ADMIN — HYDROMORPHONE HYDROCHLORIDE 4 MG: 2 TABLET ORAL at 05:48

## 2020-10-14 RX ADMIN — CLONIDINE HYDROCHLORIDE 0.2 MG: 0.1 TABLET ORAL at 22:10

## 2020-10-14 RX ADMIN — ONDANSETRON 4 MG: 2 INJECTION INTRAMUSCULAR; INTRAVENOUS at 20:40

## 2020-10-14 RX ADMIN — ALLOPURINOL 300 MG: 300 TABLET ORAL at 09:03

## 2020-10-14 RX ADMIN — LEVOTHYROXINE SODIUM 112 MCG: 112 TABLET ORAL at 07:00

## 2020-10-14 RX ADMIN — HYDROMORPHONE HYDROCHLORIDE 2 MG: 2 TABLET ORAL at 16:58

## 2020-10-14 RX ADMIN — TOLTERODINE TARTRATE 2 MG: 1 TABLET, FILM COATED ORAL at 22:10

## 2020-10-14 RX ADMIN — GABAPENTIN 900 MG: 300 CAPSULE ORAL at 22:10

## 2020-10-14 RX ADMIN — HYDROMORPHONE HYDROCHLORIDE 4 MG: 2 TABLET ORAL at 00:31

## 2020-10-14 ASSESSMENT — ACTIVITIES OF DAILY LIVING (ADL)
ADLS_ACUITY_SCORE: 19

## 2020-10-14 NOTE — PROGRESS NOTES
[facility] Progress Note  [facility]    Pt indicates that she is resting comfortably with little pain. Pt fell asleep several times during conversation. Pt indicates that her brother is staying with her and will be here to see her while she is in hospital. Pt disclosed that she attends [employer] regularly in [place]. Pt indicates no feelings of anxiety or distress during conversation.    Pt indicates that she would welcome a second visit with  during hospital stay. Follow-up when patient is more alert and able to converse.      Taj Thomas  Chaplain Resident

## 2020-10-14 NOTE — PLAN OF CARE
Patient vital signs are at baseline: Yes  Patient able to ambulate as they were prior to admission or with assist devices provided by therapies during their stay:  No,  Reason:  need more PT  Patient MUST void prior to discharge:  Yes  Patient able to tolerate oral intake:  Yes  Pain has adequate pain control using Oral analgesics:  Yes    Slept most of the shift.  Gave pain po dilaudid once this shift.  Up with heavy assist of 2.  Voiding adequate amount in BSC/purewick.  Discharge pending.

## 2020-10-14 NOTE — PROGRESS NOTES
[facility]    Hospitalist Progress Note    Brief Summary:  Mila Kumar is a 65 year old female admitted on 10/13/2020. She presents for elective right total shoulder arthroplasty secondary to complete rotator cuff tear.  Past medical history includes well-controlled hypertension, GERD, anemia secondary to iron and B12 deficiencies, morbid obesity, mild intermittent asthma, stage III CKD, NEDRA, diastolic CHF and history of lacunar infarction without residual deficit.    Assessment & Plan        S/p Right total shoulder arthroplasty 2/2 complete rotator cuff tear:  Tolerated the surgery well, further management as per Orthopedics.        Hypertension  BP well managed at home  on amlodipine, Lasix, losartan, metoprolol XL, clonidine. --  Agree with holding Amlodipine and lasix at this time.   Continue Metoprolol and Clonidine with holding parameters.  Blood pressure is reasonable control, if SBP>150 will restart the amlodipine   I will restart her home Lasix at this time.        CKD stage III  . Baseline creatinine 1.3-1.5, Avoid  NSAIDS due to her CKD.   --avoid nephrotoxic meds  Creatinine 1.35, restart home lasix.      Diabetes mellitus, type II with neuropathy  Diet/exercise controlled.  Most recent hemoglobin A1c 5.3% on 6/9/2020.  --Monitor glucose 4 times daily in the postoperative period with low correction coverage  --Continue gabapentin  Diabetes remain in good control.      Open wound on right great toe   She typically follows with Dr. Reno of podiatry.  Wound on her great toe, there is no purulent drainage and tissue appeared healthy, antibiotics are not indicated.  --continue to monitor at this time.      NEDRA  Did not tolerate BiPAP/CPAP due to discomfort, she is now on 2L supplemental oxygen at night.  --Continuous pulse oximetry night  --High risk for hypoventilation syndrome in the setting of morbid obesity, underlying NEDRA and narcotic use postoperative  "pain  Continue to monitor.      Anxiety; MDD  Has followed with psychiatry at [facility] in the past for potential PTSD.  Anxiety now well controlled on Cymbalta, and PRN Xanax for panic attacks.  --Hold Xanax in the setting of narcotic use to prevent oversedation     Hypothyroidism  Recent TSH 4.59 on 9/2020  --Continue levothyroxine     Anemia 2/2 iron and B12 deficiencies  Baseline hemoglobin 10-11.  Receives cyanocobalamin injections every 30 days.  --     Mild intermittent asthma; allergies  Well-controlled on Flovent and as needed albuterol.    --Continue PTA Flovent, Astelin, Allegra and as needed albuterol     Overactive bladder  Typically has urinary frequency and urgency, follows with Dr. Thornton of urology.   --Bladder scan as needed  --Continue PTA [place]      Functional disability 2/2 Charcot joints  Typically uses a motorized wheelchair outside of her home due to her joints, is able to walk up a flight of stairs before stopping to catch her breath.  --PT/OT     Morbid obesity s/p gastric bypass, 1991, Hx of compulsive overeating disorder  BMI greater than 40, has followed with the [facility].  She has been demonstrating continued weight loss. Has a hx of dysphasia in the past with \"eating large amounts of food\", counseled to eat small amounts.   --Monitor     GERD  --Antiemetics available, continue Pepcid     Polyarticular gout  Followed by Dr. Brannon with rheumatology.  Gout is maintained on allopurinol.  Occasionally takes prednisone burst packs due to flareups.   --Continue allopurinol     Hx of multiple lacunar infarcts  Incidentally noted on MRI brain August 2019.  Aspirin 81 mg held 7 days prior to surgery.  --ASA restarted per surgery, continue Crestor     Myofascial pain syndrome  Followed by  with PM&R and receiving trigger point injections.          DVT Prophylaxis: aspirin 325 mg as per orthopedic   Code Status: Full Code    Disposition: Expected discharge as per " orthopedic.     Deshaun Pop MD  Text Page  (7am - 6pm)    Interval History   Patient was sleeping at time of my visit, woke up easily, denies any chest pain, SOB, fever, chills, nausea or vomiting at this time.   No other significant event overnight.     -Data reviewed today: I reviewed all new labs and imaging results over the last 24 hours. I personally reviewed no images or EKG's today.    Physical Exam   Temp: 99  F (37.2  C) Temp src: Oral BP: (!) 144/81 Pulse: 98   Resp: 16 SpO2: 96 % O2 Device: Nasal cannula Oxygen Delivery: 3 LPM  Vitals:    10/13/20 0920   Weight: 131 kg (288 lb 12.8 oz)     Vital Signs with Ranges  Temp:  [97  F (36.1  C)-99  F (37.2  C)] 99  F (37.2  C)  Pulse:  [77-99] 98  Resp:  [12-20] 16  BP: (114-144)/(65-84) 144/81  SpO2:  [92 %-96 %] 96 %  I/O last 3 completed shifts:  In: 1200 [I.V.:1200]  Out: 1500 [Urine:25; Drains:1475]    Constitutional: fatigued  Eyes: Lids and lashes normal, pupils equal, round and reactive to light, extra ocular muscles intact, sclera clear, conjunctiva normal  Respiratory: No increased work of breathing, good air exchange, clear to auscultation bilaterally, no crackles or wheezing  Cardiovascular: Normal apical impulse, regular rate and rhythm, normal S1 and S2, no S3 or S4, and no murmur noted  GI: No scars, normal bowel sounds, soft, non-distended, non-tender, no masses palpated, no hepatosplenomegally  Skin: no bruising or bleeding  Musculoskeletal: no lower extremity pitting edema present  Neurologic: no focal deficit.     Medications     lactated ringers 100 mL/hr at 10/13/20 1548       allopurinol  300 mg Oral QAM     [Held by provider] amLODIPine  10 mg Oral QAM     aspirin  325 mg Oral Daily     [Held by provider] calcium citrate  950 mg Oral At Bedtime     cloNIDine  0.1 mg Oral QAM     cloNIDine  0.2 mg Oral At Bedtime     DULoxetine  60 mg Oral BID     [Held by provider] ferrous gluconate  324 mg Oral At Bedtime     [Held by provider]  furosemide  20 mg Oral QAM     gabapentin  900 mg Oral At Bedtime     insulin aspart  1-3 Units Subcutaneous TID AC     insulin aspart  1-3 Units Subcutaneous At Bedtime     levothyroxine  112 mcg Oral QAM AC     metoprolol succinate ER  50 mg Oral At Bedtime     [Held by provider] multivitamin w/minerals  1 tablet Oral QAM     [Held by provider] vitamin B6  50 mg Oral TID     rosuvastatin  20 mg Oral QAM     sodium chloride (PF)  3 mL Intracatheter Q8H     tolterodine  2 mg Oral BID     [Held by provider] vitamin D3  50 mcg Oral QAM       Data   Recent Labs   Lab 10/14/20  0654 10/13/20  0908   HGB 10.1*  --      --    POTASSIUM 4.9 4.7   CHLORIDE 107  --    CO2 28  --    BUN 21  --    CR 1.35* 1.51*   ANIONGAP 2*  --    DONTRELL 8.8  --    * 109*       Recent Results (from the past 24 hour(s))   XR Shoulder Right Port G/E 2 Views - AP/Grashey    Narrative    RIGHT SHOULDER PORTABLE TWO OR MORE VIEWS  10/13/2020 1:50 PM     HISTORY:  Status post surgery.      Impression    IMPRESSION: Reverse total shoulder arthroplasty placement with  overlying drainage tubing.    STACEY HERNANDEZ MD

## 2020-10-14 NOTE — PLAN OF CARE
Pt is A&O, VSS on 3 L of oxygen NC, afebrile. Dressing C/D/I and CMS intact with baseline neuropathy. Sling in place. Up with assist of 2, has own electric W/C in the room. Pain managed by dilaudid and PRN methocarbamol. Voiding adequately per pure wick. IV SL. Will continue to monitor.

## 2020-10-14 NOTE — CONSULTS
Care Management Initial Consult    General Information  Assessment completed with:: Patient, Mila and brother Alfonzo  Type of CM/SW Visit: Initial Assessment  Primary Care Provider verified and updated as needed?: Yes  Readmission Within the Last 30 Days: no previous admission in last 30 days  Return Category: New Diagnosis  Reason for Consult: discharge planning       Communication Assessment  Patient's communication style: spoken language (English or Bilingual)  Hearing Difficulty or Deaf: yes Wear Glasses or Blind: yes    Cognitive  Cognitive/Neuro/Behavioral: .WDL except  Level of Consciousness: somnolent  Arousal Level: arouses to touch/gentle shaking, arouses to voice  Orientation: oriented x 4  Mood/Behavior: cooperative  Best Language: 0 - No aphasia  Speech: garbled, slow    Living Environment:   People in home: alone     Current living Arrangements: apartment          Family/Social Support:  Care provided by: self, other (see comments)(siblings)  Provides care for: no one  Marital Status: Single  Who is your support system?: Sibling(s)     Description of Support System: Supportive, Involved  Description of Support System: Supportive, Involved  Adequate family and caregiver support        Description of Support System: Supportive, Involved  Support Assessment: Adequate family and caregiver support    Current Resources:   Skilled Home Care Services:    Community Resources:    Equipment currently used at home: cane, straight, grab bar, toilet, grab bar, tub/shower, raised toilet seat, walker, rolling, wheelchair, power  Supplies currently used at home:      Employment:  Employment Status:          Financial/Environmental Concerns: none identified       Lifestyle & Psychosocial Needs:        Socioeconomic History     Marital status: Single     Spouse name: Not on file     Number of children: Not on file     Years of education: Not on file     Highest education level: Not on file     Tobacco Use      Smoking status: Former Smoker     Packs/day: 2.00     Years: 22.00     Pack years: 44.00     Quit date: 10/13/1991     Years since quittin.0     Smokeless tobacco: Never Used   Substance and Sexual Activity     Alcohol use: Not Currently     Drug use: No     Sexual activity: Never       Functional Status:  Prior to admission patient needed assistance:          Mental Health Status:  WDL                            Values/Beliefs:  Spiritual, Cultural Beliefs, [employer] Practices, Values that affect care: yes  Shinto            Additional Information:  Social Work consult for discharge planning. Patient admitted on 10/13/20 for right total shoulder arthroplasty with complete rotator cuff tear. Reviewed chart and therapy recommendations for TCU at discharge. Per chart patient uses a motorized wheelchair, grab bars, raised toilet seat, straight cane and metro mobility at baseline. Spoke to patient and her brother in room regarding TCU. Patient noted that she wants to go home at discharge. When asked about support at home, patient report that her brother is staying with her until 20 and then her sister would come and stay with her. When discussing that she is a current assist of 2 patient reports that she will be careful. Explained that the concern is safety at home and that patient does not return to hospital. Patient did provide [facility] as options for TCU as a backup. Referrals sent via DOD.     Will continue to follow    KILLINA Caldwell  Inpatient   [facility]

## 2020-10-14 NOTE — PLAN OF CARE
Patient vital signs are at baseline: Yes  Patient able to ambulate as they were prior to admission or with assist devices provided by therapies during their stay:  No,  Reason:  heavy assist of 2  Patient MUST void prior to discharge:  Yes  Patient able to tolerate oral intake:  Yes  Pain has adequate pain control using Oral analgesics:  Yes

## 2020-10-14 NOTE — PROGRESS NOTES
Orthopedic Surgery  Mila Kumar  10/14/2020  Admit Date:  10/13/2020  POD 1 Day Post-Op  S/P Procedure(s):  RIGHT REVERSE TOTAL SHOULDER ARTHROPLASTY    Patient doing well.  No C/O.  Pain controlled.  Tolerating oral intake.  No events overnight.     Alert and orient to person, place, and time.  Vital Sign Ranges  Temperature Temp  Av  F (36.7  C)  Min: 96.8  F (36  C)  Max: 98.7  F (37.1  C)   Blood pressure Systolic (24hrs), Av , Min:97 , Max:142        Diastolic (24hrs), Av, Min:65, Max:84      Pulse Pulse  Av.4  Min: 77  Max: 99   Respirations Resp  Av.3  Min: 12  Max: 30   Pulse oximetry SpO2  Av.9 %  Min: 90 %  Max: 97 %       Right shoulder dressing is  is clean, dry, and intact. Minimal erythema of the surrounding skin.  Bilateral calves are soft, non-tender.  Right upper extremity is NVI.    Labs:  Recent Labs   Lab Test 10/14/20  0654 10/13/20  0908 17  1221   POTASSIUM 4.9 4.7 4.5     Recent Labs   Lab Test 10/14/20  0654 17  1221   HGB 10.1* 12.1     No results for input(s): INR in the last 27295 hours.  Recent Labs   Lab Test 17  1221          A/P  1. Exam stable s/p right reverse TSA   Continue ASA for DVT prophylaxis.     Mobilize with PT/OT for ADL's, transfers.     Continue current pain regiment.   Appreciate Hospitalist service consultation    2. Disposition   Anticipate d/c to home on 10/15/20.    Gurmeet Pacheco MD  [facility]  [phone]

## 2020-10-14 NOTE — PLAN OF CARE
Patient vital signs are at baseline: Yes  Patient able to ambulate as they were prior to admission or with assist devices provided by therapies during their stay:  No,  Reason:  needs PT  Patient MUST void prior to discharge:  No,  Reason:  dtv  Patient able to tolerate oral intake:  Yes  Pain has adequate pain control using Oral analgesics:  No,  Reason:  block still intact.    CMS intact.  Wheel chair bound.  Wears O2 at bedtime.  Purewick in place, DTV.

## 2020-10-14 NOTE — PROGRESS NOTES
10/14/20 0915   Quick Adds   Type of Visit Initial Occupational Therapy Evaluation   Living Environment   People in home alone   Current Living Arrangements apartment   Transportation Anticipated other (see comments)  (pt takes Metro Mobility when needed)   Living Environment Comments pt vague about her apt- laundry down the [address], did not state what floor she was on or if there was an elevator   Self-Care   Usual Activity Tolerance moderate   Current Activity Tolerance poor   Equipment Currently Used at Home cane, straight;grab bar, toilet;grab bar, tub/shower;raised toilet seat;walker, rolling;wheelchair, power   Activity/Exercise/Self-Care Comment pt states she dresses and showers herself.  Walk-in shower with shower chair   Disability/Function   Hearing Difficulty or Deaf yes   Patient's preferred means of communication verbal   Describe hearing loss bilateral hearing loss   Use of hearing assistive devices none   Were auxiliary aids offered? no   Wear Glasses or Blind yes   Vision Management glasses   Concentrating, Remembering or Making Decisions Difficulty yes   Difficulty Communicating yes   Communication difficulty speaking   Communication Management very sleepy during session   Difficulty Eating/Swallowing no   Walking or Climbing Stairs Difficulty yes   Walking or Climbing Stairs   (power wheelchair)   Mobility Management power wheelchair   Dressing/Bathing Difficulty yes   Dressing/Bathing bathing difficulty, requires equipment   Toileting no   Doing Errands Independently Difficulty (such as shopping) yes   Errands Management n   General Information   Onset of Illness/Injury or Date of Surgery 10/13/20   Referring Physician Gurmeet Trevino   Patient/Family Therapy Goal Statement (OT) return home   Additional Occupational Profile Info/Pertinent History of Current Problem Pt admitted for a right total reverse shoulder surgery procedure.  PMH includes HTN, GERD, anemia, morbid obesity, mild intermittant  asthma, CKDIII, NEDRA, Hx lacunar infartion, diastolic CHF, charcots joints for WC use   Existing Precautions/Restrictions shoulder   Cognitive Status Examination   Orientation Status orientation to person, place and time   Affect/Mental Status (Cognitive) WNL   Cognitive Status Comments pt very sleepy for much of session but oriented   Pain Assessment   Patient Currently in Pain Yes, see Vital Sign flowsheet   Range of Motion Comprehensive   General Range of Motion upper extremity range of motion deficits identified   Comment, General Range of Motion right side limited by shoulder surgery, left side is WFL   Strength Comprehensive (MMT)   General Manual Muscle Testing (MMT) Assessment no strength deficits identified   Coordination   Upper Extremity Coordination No deficits were identified   Bed Mobility   Bed Mobility supine-sit   Supine-Sit Bend (Bed Mobility) moderate assist (50% patient effort);2 person assist   Transfers   Transfers sit-stand transfer   Sit-Stand Transfer   Sit-Stand Bend (Transfers) verbal cues;minimum assist (75% patient effort);2 person assist   Sit/Stand Transfer Comments moderate assist to stand, move to chair minimal asssit   Activities of Daily Living   BADL Assessment/Intervention lower body dressing   Lower Body Dressing Assessment/Training   Bend Level (Lower Body Dressing) unable to perform   Clinical Impression   Criteria for Skilled Therapeutic Interventions Met (OT) yes   OT Diagnosis decreased indpendence with ADLS   OT Problem List-Impairments impacting ADL problems related to;balance;range of motion (ROM);strength;post-surgical precautions   ADL comments/analysis Pt was vague but states her brother can help, sister coming in early November   Assessment of Occupational Performance 3-5 Performance Deficits   Identified Performance Deficits decreased independence in dressing, badhing, functional mobility, household chores   Planned Therapy Interventions (OT)  "ADL retraining;ROM;strengthening;progressive activity/exercise   Clinical Decision Making Complexity (OT) moderate complexity   Therapy Frequency (OT) 2x/day   Predicted Duration of Therapy 4 days   Risks and Benefits of Treatment have been explained. Yes   Patient, Family & other staff in agreement with plan of care Yes   OT Discharge Planning    OT Discharge Recommendation (DC Rec) Transitional Care Facility   OT Rationale for DC Rec Pt currently below baseline, sleepy for much of session.  Mod A of 2 for bed mobillity.  Not clear about how much help she will have at home.  At this time appears to need regular supervision and 2 for cares.  Pt stated she will not go to TCU.  If this is declined family will likely need to have extra help at home to manage pt for basic cares such as dressing, bathing, and bed mobility.  Pt's wheelchair has a right sided controll which she may not be able to operate given her surgery   OT Brief overview of current status  Mod A of 2 for bed mobility, Max A for LE dressing.  Does not appear able to control her power chair at this time.    [facility] [employer] \"6 Clicks\"   2016, Trustees of [facility], under license to [employer].  All rights reserved.   6 Clicks Short Forms Daily Activity Inpatient Short Form   [facility] AM-PAC  \"6 Clicks\" Daily Activity Inpatient Short Form   1. Putting on and taking off regular lower body clothing? 2 - A Lot   2. Bathing (including washing, rinsing, drying)? 1 - Total   3. Toileting, which includes using toilet, bedpan or urinal? 2 - A Lot   4. Putting on and taking off regular upper body clothing? 2 - A Lot   5. Taking care of personal grooming such as brushing teeth? 3 - A Little   6. Eating meals? 3 - A Little   Daily Activity Raw Score (Score out of 24.Lower scores equate to lower levels of function) 13   Total Evaluation Time (Minutes)   Total Evaluation Time (Minutes) 15     "

## 2020-10-15 ENCOUNTER — APPOINTMENT (OUTPATIENT)
Dept: OCCUPATIONAL THERAPY | Facility: CLINIC | Age: 65
DRG: 483 | End: 2020-10-15
Attending: ORTHOPAEDIC SURGERY
Payer: COMMERCIAL

## 2020-10-15 LAB
GLUCOSE BLDC GLUCOMTR-MCNC: 110 MG/DL (ref 70–99)
GLUCOSE BLDC GLUCOMTR-MCNC: 127 MG/DL (ref 70–99)
GLUCOSE BLDC GLUCOMTR-MCNC: 142 MG/DL (ref 70–99)
GLUCOSE BLDC GLUCOMTR-MCNC: 92 MG/DL (ref 70–99)
GLUCOSE SERPL-MCNC: 109 MG/DL (ref 70–99)
HGB BLD-MCNC: 9.7 G/DL (ref 11.7–15.7)

## 2020-10-15 PROCEDURE — 250N000013 HC RX MED GY IP 250 OP 250 PS 637: Performed by: NURSE PRACTITIONER

## 2020-10-15 PROCEDURE — 36415 COLL VENOUS BLD VENIPUNCTURE: CPT | Performed by: ORTHOPAEDIC SURGERY

## 2020-10-15 PROCEDURE — 250N000013 HC RX MED GY IP 250 OP 250 PS 637: Performed by: ORTHOPAEDIC SURGERY

## 2020-10-15 PROCEDURE — 97530 THERAPEUTIC ACTIVITIES: CPT | Mod: GO | Performed by: OCCUPATIONAL THERAPY ASSISTANT

## 2020-10-15 PROCEDURE — 999N001017 HC STATISTIC GLUCOSE BY METER IP

## 2020-10-15 PROCEDURE — 85018 HEMOGLOBIN: CPT | Performed by: ORTHOPAEDIC SURGERY

## 2020-10-15 PROCEDURE — 82947 ASSAY GLUCOSE BLOOD QUANT: CPT | Performed by: ORTHOPAEDIC SURGERY

## 2020-10-15 PROCEDURE — 99232 SBSQ HOSP IP/OBS MODERATE 35: CPT | Performed by: INTERNAL MEDICINE

## 2020-10-15 PROCEDURE — 97110 THERAPEUTIC EXERCISES: CPT | Mod: GO | Performed by: OCCUPATIONAL THERAPY ASSISTANT

## 2020-10-15 PROCEDURE — 120N000001 HC R&B MED SURG/OB

## 2020-10-15 RX ADMIN — ROSUVASTATIN CALCIUM 20 MG: 20 TABLET, FILM COATED ORAL at 08:15

## 2020-10-15 RX ADMIN — HYDROMORPHONE HYDROCHLORIDE 2 MG: 2 TABLET ORAL at 00:16

## 2020-10-15 RX ADMIN — ALLOPURINOL 300 MG: 300 TABLET ORAL at 08:15

## 2020-10-15 RX ADMIN — TOLTERODINE TARTRATE 2 MG: 1 TABLET, FILM COATED ORAL at 08:15

## 2020-10-15 RX ADMIN — CLONIDINE HYDROCHLORIDE 0.2 MG: 0.1 TABLET ORAL at 20:34

## 2020-10-15 RX ADMIN — DULOXETINE HYDROCHLORIDE 60 MG: 60 CAPSULE, DELAYED RELEASE ORAL at 20:34

## 2020-10-15 RX ADMIN — ASPIRIN 325 MG: 325 TABLET, COATED ORAL at 08:15

## 2020-10-15 RX ADMIN — GABAPENTIN 900 MG: 300 CAPSULE ORAL at 20:34

## 2020-10-15 RX ADMIN — METOPROLOL SUCCINATE 50 MG: 50 TABLET, EXTENDED RELEASE ORAL at 20:34

## 2020-10-15 RX ADMIN — FUROSEMIDE 20 MG: 20 TABLET ORAL at 08:15

## 2020-10-15 RX ADMIN — LEVOTHYROXINE SODIUM 112 MCG: 112 TABLET ORAL at 07:10

## 2020-10-15 RX ADMIN — CLONIDINE HYDROCHLORIDE 0.1 MG: 0.1 TABLET ORAL at 08:16

## 2020-10-15 RX ADMIN — DULOXETINE HYDROCHLORIDE 60 MG: 60 CAPSULE, DELAYED RELEASE ORAL at 08:16

## 2020-10-15 RX ADMIN — TOLTERODINE TARTRATE 2 MG: 1 TABLET, FILM COATED ORAL at 20:33

## 2020-10-15 RX ADMIN — HYDROMORPHONE HYDROCHLORIDE 2 MG: 2 TABLET ORAL at 10:48

## 2020-10-15 ASSESSMENT — ACTIVITIES OF DAILY LIVING (ADL)
ADLS_ACUITY_SCORE: 18

## 2020-10-15 NOTE — PROGRESS NOTES
Care Management Follow Up Note    Length of Stay (days) 2    Patient plan of care discussed at Interdisciplinary Rounds: yes  Expected Discharge Date: 10/16/20(TCU)  Concerns to be Addressed:   Discharge planning       Anticipated Discharge Disposition:  TCU   Anticipated Discharge Services:    Anticipated Discharge DME:      Plan:  [facility] both declined patient due to [employer]. Provided patient with [employer] list and explained the process. Patient continued to report that she just wants to go home. She agreed to review list and writer will follow up for choices.     Addendum: patient provided [facility] as her only choice. At this time is unwilling to provide a second choice. Referral sent via DOD.    [facility] accepted patient pending insurance authorization and PT evaluation.     Will continue to follow    KILLIAN Caldwell  Inpatient   [facility]

## 2020-10-15 NOTE — PROGRESS NOTES
[facility]    Hospitalist Progress Note    Brief Summary:  Mila Kumar is a 65 year old female admitted on 10/13/2020. She presents for elective right total shoulder arthroplasty secondary to complete rotator cuff tear.  Past medical history includes well-controlled hypertension, GERD, anemia secondary to iron and B12 deficiencies, morbid obesity, mild intermittent asthma, stage III CKD, NEDRA, diastolic CHF and history of lacunar infarction without residual deficit.    Assessment & Plan        S/p Right total shoulder arthroplasty 2/2 complete rotator cuff tear:  Tolerated the surgery well, further management as per Orthopedics.        Hypertension  BP well managed at home  on amlodipine, Lasix,  metoprolol XL, clonidine. --  Continue to hold amlodipine at this time.    Continue Metoprolol, lasix  and Clonidine with holding parameters.  Blood pressure is well control at this time.    if SBP>150 will restart the amlodipine          CKD stage III  . Baseline creatinine 1.3-1.5, Avoid  NSAIDS due to her CKD.   --avoid nephrotoxic meds  Creatinine 1.35, restart home lasix.      Diabetes mellitus, type II with neuropathy  Diet/exercise controlled.  Most recent hemoglobin A1c 5.3% on 6/9/2020.  --Monitor glucose 4 times daily in the postoperative period with low correction coverage  --Continue gabapentin  Diabetes remain in good control.      Open wound on right great toe   She typically follows with Dr. Reno of podiatry.  Wound on her great toe, there is no purulent drainage and tissue appeared healthy, antibiotics are not indicated.  --continue to monitor at this time.      NEDRA  Did not tolerate BiPAP/CPAP due to discomfort, she is now on 2L supplemental oxygen at night.  --Continuous pulse oximetry night  --High risk for hypoventilation syndrome in the setting of morbid obesity, underlying NEDRA and narcotic use postoperative pain  Continue to monitor.      Anxiety; MDD  Has  "followed with psychiatry at [facility] in the past for potential PTSD.  Anxiety now well controlled on Cymbalta, and PRN Xanax for panic attacks.  --Hold Xanax in the setting of narcotic use to prevent oversedation     Hypothyroidism  Recent TSH 4.59 on 9/2020  --Continue levothyroxine     Anemia 2/2 iron and B12 deficiencies  Baseline hemoglobin 10-11.  Receives cyanocobalamin injections every 30 days.  --     Mild intermittent asthma; allergies  Well-controlled on Flovent and as needed albuterol.    --Continue PTA Flovent, Astelin, Allegra and as needed albuterol     Overactive bladder  Typically has urinary frequency and urgency, follows with Dr. Thornton of urology.   --Bladder scan as needed  --Continue PTA [place]      Functional disability 2/2 Hernan joints  Typically uses a motorized wheelchair outside of her home due to her joints, is able to walk up a flight of stairs before stopping to catch her breath.  --PT/OT     Morbid obesity s/p gastric bypass, 1991, Hx of compulsive overeating disorder  BMI greater than 40, has followed with the [facility].  She has been demonstrating continued weight loss. Has a hx of dysphasia in the past with \"eating large amounts of food\", counseled to eat small amounts.   --Monitor     GERD  --Antiemetics available, continue Pepcid     Polyarticular gout  Followed by Dr. Brannon with rheumatology.  Gout is maintained on allopurinol.  Occasionally takes prednisone burst packs due to flareups.   --Continue allopurinol     Hx of multiple lacunar infarcts  Incidentally noted on MRI brain August 2019.  Aspirin 81 mg held 7 days prior to surgery.  --ASA restarted per surgery, continue Crestor     Myofascial pain syndrome  Followed by  with PM&R and receiving trigger point injections.          DVT Prophylaxis: aspirin 325 mg as per orthopedic   Code Status: Full Code    Disposition: Expected discharge as per orthopedic.     Deshaun Pop MD  Text Page  (7am - " 6pm)    Interval History   No active complaints this morning, told me that she is going home.   Much more awake and alert this morning, denies any chest, SOB, fever, chills, nausea or vomiting.     No other significant event overnight.     -Data reviewed today: I reviewed all new labs and imaging results over the last 24 hours. I personally reviewed no images or EKG's today.    Physical Exam   Temp: 99.3  F (37.4  C) Temp src: Oral BP: 123/71 Pulse: 98   Resp: 16 SpO2: 94 % O2 Device: Nasal cannula Oxygen Delivery: 2 LPM  Vitals:    10/13/20 0920   Weight: 131 kg (288 lb 12.8 oz)     Vital Signs with Ranges  Temp:  [98.5  F (36.9  C)-100.3  F (37.9  C)] 99.3  F (37.4  C)  Pulse:  [] 98  Resp:  [15-16] 16  BP: (118-155)/(71-88) 123/71  SpO2:  [94 %-98 %] 94 %  I/O last 3 completed shifts:  In: 240 [P.O.:240]  Out: 1500 [Urine:1500]    Constitutional: fatigued  Eyes: Lids and lashes normal, pupils equal, round and reactive to light, extra ocular muscles intact, sclera clear, conjunctiva normal  Respiratory: No increased work of breathing, good air exchange, clear to auscultation bilaterally, no crackles or wheezing  Cardiovascular: Normal apical impulse, regular rate and rhythm, normal S1 and S2, no S3 or S4, and no murmur noted  GI: No scars, normal bowel sounds, soft, non-distended, non-tender, no masses palpated, no hepatosplenomegally  Skin: no bruising or bleeding  Musculoskeletal: no lower extremity pitting edema present  Neurologic: no focal deficit.     Medications       allopurinol  300 mg Oral QAM     [Held by provider] amLODIPine  10 mg Oral QAM     aspirin  325 mg Oral Daily     [Held by provider] calcium citrate  950 mg Oral At Bedtime     cloNIDine  0.1 mg Oral QAM     cloNIDine  0.2 mg Oral At Bedtime     DULoxetine  60 mg Oral BID     [Held by provider] ferrous gluconate  324 mg Oral At Bedtime     furosemide  20 mg Oral QAM     gabapentin  900 mg Oral At Bedtime     insulin aspart  1-3 Units  Subcutaneous TID AC     insulin aspart  1-3 Units Subcutaneous At Bedtime     levothyroxine  112 mcg Oral QAM AC     metoprolol succinate ER  50 mg Oral At Bedtime     [Held by provider] multivitamin w/minerals  1 tablet Oral QAM     [Held by provider] vitamin B6  50 mg Oral TID     rosuvastatin  20 mg Oral QAM     sodium chloride (PF)  3 mL Intracatheter Q8H     tolterodine  2 mg Oral BID     [Held by provider] vitamin D3  50 mcg Oral QAM       Data   Recent Labs   Lab 10/15/20  0645 10/14/20  0654 10/13/20  0908   HGB 9.7* 10.1*  --    NA  --  137  --    POTASSIUM  --  4.9 4.7   CHLORIDE  --  107  --    CO2  --  28  --    BUN  --  21  --    CR  --  1.35* 1.51*   ANIONGAP  --  2*  --    DONTRELL  --  8.8  --    * 126* 109*       No results found for this or any previous visit (from the past 24 hour(s)).

## 2020-10-15 NOTE — PLAN OF CARE
Patient vital signs are at baseline: Yes  Patient able to ambulate as they were prior to admission or with assist devices provided by therapies during their stay:  No,  Reason: heavy assist of 2, using lift to get back in bed from chair  Patient MUST void prior to discharge:  Yes  Patient able to tolerate oral intake:  Yes  Pain has adequate pain control using Oral analgesics:  Yes    Dressing CDI.  Discharge to TCU pending.

## 2020-10-15 NOTE — PLAN OF CARE
Patient vital signs are at baseline: Yes  Patient able to ambulate as they were prior to admission or with assist devices provided by therapies during their stay:  Yes  Patient MUST void prior to discharge:  Yes  Patient able to tolerate oral intake:  Yes  Pain has adequate pain control using Oral analgesics:  Yes      Pt A&Ox4. VSS on 2L at HS. CMS intact. Dressing c/d/i. Voiding via purewich. Turn side to side when in bed. Pain managed with PRN dilaudid x1. IV SL. Tele is NSR. Will continue to monitor.

## 2020-10-15 NOTE — PROGRESS NOTES
Orthopedic Surgery  Mila Kumar  10/15/2020  Admit Date:  10/13/2020  POD 2 Days Post-Op  S/P Procedure(s):  RIGHT REVERSE TOTAL SHOULDER ARTHROPLASTY    Patient stable over night.  Pain controlled.  Tolerating oral intake.  No events overnight.     Alert and orient to person, place, and time.  Vital Sign Ranges  Temperature Temp  Av  F (37.2  C)  Min: 98.5  F (36.9  C)  Max: 100.3  F (37.9  C)   Blood pressure Systolic (24hrs), Av , Min:118 , Max:155        Diastolic (24hrs), Av, Min:72, Max:88      Pulse Pulse  Av  Min: 94  Max: 114   Respirations Resp  Avg: 15.5  Min: 14  Max: 16   Pulse oximetry SpO2  Av.3 %  Min: 94 %  Max: 98 %       Right shoulder dressing  is clean, dry, and intact. Minimal erythema of the surrounding skin.  Bilateral calves are soft, non-tender.  Right upper extremity is NVI.    Labs:  Recent Labs   Lab Test 10/14/20  0654 10/13/20  0908 17  1221   POTASSIUM 4.9 4.7 4.5     Recent Labs   Lab Test 10/15/20  0645 10/14/20  0654 17  1221   HGB 9.7* 10.1* 12.1     No results for input(s): INR in the last 83106 hours.  Recent Labs   Lab Test 17  1221          A/P  1. S/p RIGHT REVERSE TOTAL SHOULDER ARTHROPLASTY   Continue ASA, TEDS, SCD's  for DVT prophylaxis.     Mobilize with PT/OT NWB right arm.     Continue current pain regiment.    2. Disposition   Anticipate d/c to TCU as family doesn't feel capable of managing patient in home setting - per discussion with nursing staff.   involved.  Continue to manage medically.  Appreciate help of Hospitalist service.      Gurmeet Pacheco MD  [facility] [place]  [phone]

## 2020-10-16 ENCOUNTER — APPOINTMENT (OUTPATIENT)
Dept: OCCUPATIONAL THERAPY | Facility: CLINIC | Age: 65
DRG: 483 | End: 2020-10-16
Attending: ORTHOPAEDIC SURGERY
Payer: COMMERCIAL

## 2020-10-16 ENCOUNTER — APPOINTMENT (OUTPATIENT)
Dept: PHYSICAL THERAPY | Facility: CLINIC | Age: 65
DRG: 483 | End: 2020-10-16
Attending: ORTHOPAEDIC SURGERY
Payer: COMMERCIAL

## 2020-10-16 LAB
GLUCOSE BLDC GLUCOMTR-MCNC: 101 MG/DL (ref 70–99)
GLUCOSE BLDC GLUCOMTR-MCNC: 116 MG/DL (ref 70–99)
GLUCOSE BLDC GLUCOMTR-MCNC: 126 MG/DL (ref 70–99)
GLUCOSE BLDC GLUCOMTR-MCNC: 130 MG/DL (ref 70–99)
GLUCOSE BLDC GLUCOMTR-MCNC: 95 MG/DL (ref 70–99)

## 2020-10-16 PROCEDURE — 250N000013 HC RX MED GY IP 250 OP 250 PS 637: Performed by: NURSE PRACTITIONER

## 2020-10-16 PROCEDURE — 250N000013 HC RX MED GY IP 250 OP 250 PS 637: Performed by: ORTHOPAEDIC SURGERY

## 2020-10-16 PROCEDURE — 999N001017 HC STATISTIC GLUCOSE BY METER IP

## 2020-10-16 PROCEDURE — 99231 SBSQ HOSP IP/OBS SF/LOW 25: CPT | Performed by: INTERNAL MEDICINE

## 2020-10-16 PROCEDURE — 97530 THERAPEUTIC ACTIVITIES: CPT | Mod: GP

## 2020-10-16 PROCEDURE — 97110 THERAPEUTIC EXERCISES: CPT | Mod: GO | Performed by: OCCUPATIONAL THERAPIST

## 2020-10-16 PROCEDURE — 120N000001 HC R&B MED SURG/OB

## 2020-10-16 PROCEDURE — 97161 PT EVAL LOW COMPLEX 20 MIN: CPT | Mod: GP

## 2020-10-16 PROCEDURE — 97535 SELF CARE MNGMENT TRAINING: CPT | Mod: GO | Performed by: OCCUPATIONAL THERAPIST

## 2020-10-16 RX ORDER — HYDROMORPHONE HYDROCHLORIDE 2 MG/1
2-4 TABLET ORAL EVERY 6 HOURS PRN
Qty: 30 TABLET | Refills: 0 | Status: SHIPPED | OUTPATIENT
Start: 2020-10-16 | End: 2020-10-22

## 2020-10-16 RX ADMIN — HYDROMORPHONE HYDROCHLORIDE 2 MG: 2 TABLET ORAL at 00:42

## 2020-10-16 RX ADMIN — Medication 50 MG: at 09:44

## 2020-10-16 RX ADMIN — Medication 50 MG: at 20:55

## 2020-10-16 RX ADMIN — ALLOPURINOL 300 MG: 300 TABLET ORAL at 08:06

## 2020-10-16 RX ADMIN — METOPROLOL SUCCINATE 50 MG: 50 TABLET, EXTENDED RELEASE ORAL at 20:56

## 2020-10-16 RX ADMIN — HYDROMORPHONE HYDROCHLORIDE 2 MG: 2 TABLET ORAL at 07:04

## 2020-10-16 RX ADMIN — ROSUVASTATIN CALCIUM 20 MG: 20 TABLET, FILM COATED ORAL at 08:07

## 2020-10-16 RX ADMIN — FERROUS GLUCONATE 324 MG: 324 TABLET ORAL at 20:56

## 2020-10-16 RX ADMIN — HYDROMORPHONE HYDROCHLORIDE 4 MG: 2 TABLET ORAL at 20:57

## 2020-10-16 RX ADMIN — HYDROMORPHONE HYDROCHLORIDE 2 MG: 2 TABLET ORAL at 11:30

## 2020-10-16 RX ADMIN — CALCIUM CITRATE 200 MG (950 MG) TABLET 950 MG: at 20:56

## 2020-10-16 RX ADMIN — GABAPENTIN 900 MG: 300 CAPSULE ORAL at 20:55

## 2020-10-16 RX ADMIN — CLONIDINE HYDROCHLORIDE 0.1 MG: 0.1 TABLET ORAL at 08:07

## 2020-10-16 RX ADMIN — ASPIRIN 325 MG: 325 TABLET, COATED ORAL at 08:07

## 2020-10-16 RX ADMIN — TOLTERODINE TARTRATE 2 MG: 1 TABLET, FILM COATED ORAL at 20:53

## 2020-10-16 RX ADMIN — FUROSEMIDE 20 MG: 20 TABLET ORAL at 08:07

## 2020-10-16 RX ADMIN — CLONIDINE HYDROCHLORIDE 0.2 MG: 0.1 TABLET ORAL at 20:53

## 2020-10-16 RX ADMIN — DULOXETINE HYDROCHLORIDE 60 MG: 60 CAPSULE, DELAYED RELEASE ORAL at 08:06

## 2020-10-16 RX ADMIN — MULTIPLE VITAMINS W/ MINERALS TAB 1 TABLET: TAB at 09:44

## 2020-10-16 RX ADMIN — Medication 50 MCG: at 09:44

## 2020-10-16 RX ADMIN — DULOXETINE HYDROCHLORIDE 60 MG: 60 CAPSULE, DELAYED RELEASE ORAL at 20:52

## 2020-10-16 RX ADMIN — HYDROMORPHONE HYDROCHLORIDE 2 MG: 2 TABLET ORAL at 15:48

## 2020-10-16 RX ADMIN — Medication 50 MG: at 15:48

## 2020-10-16 RX ADMIN — LEVOTHYROXINE SODIUM 112 MCG: 112 TABLET ORAL at 07:04

## 2020-10-16 RX ADMIN — TOLTERODINE TARTRATE 2 MG: 1 TABLET, FILM COATED ORAL at 08:07

## 2020-10-16 ASSESSMENT — ACTIVITIES OF DAILY LIVING (ADL)
ADLS_ACUITY_SCORE: 20
ADLS_ACUITY_SCORE: 20
ADLS_ACUITY_SCORE: 18
ADLS_ACUITY_SCORE: 18
ADLS_ACUITY_SCORE: 20
ADLS_ACUITY_SCORE: 18

## 2020-10-16 NOTE — DISCHARGE INSTRUCTIONS
"  ORTHOPEDIC SURGERY DISCHARGE PATIENT INSTRUCTIONS        Post-Operative: Shoulder Surgery    Diet:   - Resume your usual diet as tolerated   - To prevent constipation, increase your daily fiber content.  Fiber rich foods include fruits and whole grains   - To prevent constipation and dehydration, drink 6-8 glasses of water daily, unless instructed differently    Swelling and Pain Management:    - Expect that you will have pain.  Pain medications will help to make you more comfortable, but they may not take all the pain away   - You will be given a prescription for a pain medication when you leave the hospital.  Expect your pain to improve over time and your need for pain medication to decrease over time   - Don't wait until discomfort get the best of you to take medication.  Stay on top of your pain.   - For timely refills, contact you pharmacy even if the bottle does not have any refills listed   - Swelling is normal.  Do your elbow / wrist / hand exercises at least 3 times per day to help decrease lower arm swelling   - Apply ice packs to the surgical area for the first 48-72 hours   - OK to supplement oral narcotic prescription with OTC NSAIDS such as Advil / Aleve per Manufacturers recommended dosing   - OK to supplement oral narcotic prescription with OTC Tylenol per Manufacturers recommended dosing    Anticoagulation:    - If not on Aspirin pre-operatively, patient should take One Aspirin (81 mg) twice daily for 5 weeks postoperatively    Dressing Changes and Showering:   - Hand washing (or alcohol-based hand cleanser) is the most important step for preventing infection   - Do not put any ointments or creams on the incisions   - It is OK to begin showering THE DAY AFTER SURGERY   - Keep incisions clean and dry.  When showering, cover incision with plastic wrap like \"Glad Press-n-Seal\" or \"Saran Wrap\"   - Expect swelling and light bloody drainage immediately after surgery in the dressings   - No tub baths, " hot tubs, Jacuzzi, Sauna, or heating pad use for 6 wks   - KEEP DRESSING ON UNTIL FOLLOW UP APPOINTMENT    Hygiene:    - Wash your underarms daily with an antibacterial soap.  Keep a light pad or folded washcloth under your arm to absorb an moisture   - Remove your immobilizer and gently lean forward so your surgical arm moves out slightly in front of your body.  You can then reach under the arm for cleaning   - DO NOT actively move the affected arm away from your body    Abductor Sling:    - To Position the sling  correctly, Your hand should rest close to the midline of your body   - The abductor sling  can be removed for showers, getting dressed, daily cleansing of your armpit area, and elbow range of motion exercises   - If you went home with a sling only (with no pillow), you can start  moving your shoulder and gently getting motion as soon as you are comfortable.  A sling is only used for patients who did not have any type of soft tissue repair like a rotator cuff repair or labral repair    Activities:    - Do not get discouraged.  You will have good days and bad days with your recovery   - Physical therapy exercises will be ordered at your first post-operative visit   - Sleeping while sitting in an upright position is most comfortable ( recliner or propped up in bed with pillows)   - Elbow (Flexion/extension; Pronation/supination) / Wrist (flexion/extension) / Hand exercises can be started on the first day after surgery and do them 5-6 times per day   - As your activities and physical therapy increase, you will have an increase in your pain. Take pain medications as prescribed, rest and ice as needed    Driving/Work:    - Driving my begin once you are able to safely steer and shift.  You must also be no longer taking narcotic pain medications.  This could take up to 2-4 weeks or longer and will be determined at your first post operative visit.     - Based on the nature of the procedure that was performed, you  will be off of work for a minimum of approximately 10 days. Return to work will be discussed at your follow up appointment.        WHEN TO CALL:  [facility]  -  [phone] ; Dr Pacheco's Care Coordinator:   [phone]       - Incision opens up in any spot       - Pain medications causing nausea or vomiting       - Sharp increase in shoulder pain that is not responding to medications       - Increased redness at the incision site or entire arm       - increased drainage, foul odor or smell from incisions       - Excessive bleeding not controlled by dressings       - Numbness or tingling in surgical hand or fingers 24 hours after pain block        - Fever of 100 or higher or chills for at least 24 hours (note: a mild fever for 1st or 2nd day after surgery is common)   - You should proceed to the nearest emergency room or dial 911 if you experience a rapid heart rate, chest pain, unrelenting pain, or difficulty breathing      IF YOU HAVE CHEST PAIN OR SHORTNESS OF BREATH DIAL 911

## 2020-10-16 NOTE — PROGRESS NOTES
Orthopedic Surgery  Mila Kumar  10/16/2020  Admit Date:  10/13/2020  POD 3 Days Post-Op  S/P Procedure(s):  RIGHT REVERSE TOTAL SHOULDER ARTHROPLASTY    Patient is doing well.  Transfering with 2 assist.  Pain controlled.  Tolerating oral intake.  No events overnight.     Alert and orient to person, place, and time.  Vital Sign Ranges  Temperature Temp  Av.1  F (37.3  C)  Min: 98.2  F (36.8  C)  Max: 99.7  F (37.6  C)   Blood pressure Systolic (24hrs), Av , Min:118 , Max:136        Diastolic (24hrs), Av, Min:71, Max:82      Pulse Pulse  Av.6  Min: 96  Max: 105   Respirations Resp  Avg: 15.6  Min: 14  Max: 16   Pulse oximetry SpO2  Av.8 %  Min: 94 %  Max: 97 %       Right shoulder dressing is clean, dry, and intact. Minimal erythema of the surrounding skin.  Bilateral calves are soft, non-tender.  Right upper extremity is NVI.    Labs:  Recent Labs   Lab Test 10/14/20  0654 10/13/20  0908 17  1221   POTASSIUM 4.9 4.7 4.5     Recent Labs   Lab Test 10/15/20  0645 10/14/20  0654 17  1221   HGB 9.7* 10.1* 12.1     No results for input(s): INR in the last 13648 hours.  Recent Labs   Lab Test 17  1221          A/P  1. Stable exam s/p   RIGHT REVERSE TOTAL SHOULDER ARTHROPLASTY   Continue ASA for DVT prophylaxis.     Mobilize with PT/OT NWB right arm.     Continue current pain regiment.    2. Disposition   Anticipate d/c to TCU as soon as is accepted.

## 2020-10-16 NOTE — PLAN OF CARE
Pt A&O X4. VSS on RA. CMS intact. No IV access, MD aware. Up with A2 to BSC, voiding adequately. Managing pain with po dilaudid. Continue to monitor.

## 2020-10-16 NOTE — PROGRESS NOTES
10/16/20 1329   Quick Adds   Type of Visit Initial PT Evaluation   Living Environment   People in home alone  (state brother might be moving in with her in December)   Current Living Arrangements apartment   Home Accessibility no concerns   Living Environment Comments Bathroom includes walk in shower with shower chair, comfort height toilet.   Self-Care   Usual Activity Tolerance moderate   Current Activity Tolerance fair   Equipment Currently Used at Home raised toilet seat;shower chair;walker, rolling;wheelchair, power   Activity/Exercise/Self-Care Comment Uses 4WW inside apartment and motorized WC for community mobility   Disability/Function   Walking or Climbing Stairs Difficulty yes   Mobility Management 4WW inside apartment, motorized WC for community mobility   Fall history within last six months no  (per pt)   General Information   Onset of Illness/Injury or Date of Surgery 10/13/20   Referring Physician Gurmeet Pacheco MD   Patient/Family Therapy Goals Statement (PT) not stated   Pertinent History of Current Problem (include personal factors and/or comorbidities that impact the POC) Pt is POD # 3 R reverse TSA. NWB R UE.  Past medical history includes well-controlled hypertension, GERD, anemia secondary to iron and B12 deficiencies, morbid obesity, mild intermittent asthma, stage III CKD, NEDRA, diastolic CHF and history of lacunar infarction without residual deficit.   Weight-Bearing Status - RUE nonweight-bearing  (in abduction sling throughout)   Cognition   Orientation Status (Cognition) oriented x 4   Pain Assessment   Patient Currently in Pain Yes, see Vital Sign flowsheet  (7/10 R shoulder/arm)   Posture    Posture Forward head position;Protracted shoulders   Posture Comments Flexed posture standing   Range of Motion (ROM)   ROM Comment R shoulder ROM restricted due to post-op status; pt in R UE ABDuction brace throughout session   Strength   Strength Comments Pt demos significant LE weakness  with transfers & decreased trunk strength with bed mobility   Bed Mobility   Bed Mobility supine-sit   Supine-Sit Rush (Bed Mobility) moderate assist (50% patient effort);2 person assist   Bed Mobility Limitations decreased ability to use arms for pushing/pulling;impaired ability to control trunk for mobility  (NWB R UE)   Impairments Contributing to Impaired Bed Mobility pain;decreased ROM;decreased strength   Transfers   Transfers sit-stand transfer   Transfer Safety Comments Attempted sit>stand from bed in lowest position with Ax2, Pt able to come to standing with Ax2 but hips/ COM posterior to SHIRA and feet sliding forward, with LEs braced on EOB, not safe to take steps, returned to sitting   Sit-Stand Transfer   Sit-Stand Rush (Transfers) maximum assist (25% patient effort);2 person assist   Sit/Stand Transfer Comments MaxA with ModA of 2nd person   Gait/Stairs (Locomotion)   Comment (Gait/Stairs) After 2nd time standing, able to take a few steps to turn to bedside WC (pt's own power WC) with MinAx2   Balance   Balance Comments Good sitting balance, impaired standing balance, with tendecy for posterior hips    Sensory Examination   Sensory Perception Comments Reports baseline tingling B feet & hands   Clinical Impression   Criteria for Skilled Therapeutic Intervention yes, treatment indicated   PT Diagnosis (PT) difficulty ambulating   Influenced by the following impairments NWB R UE & unable to use usual AD (4WW) due to this, impaired balance, generalized weakness, decreased activity tolerance   Functional limitations due to impairments difficulty with bed mobility, transfers, ambulation, stairs   Clinical Presentation Stable/Uncomplicated   Clinical Presentation Rationale clinical judgement   Clinical Decision Making (Complexity) low complexity   Therapy Frequency (PT) 5x/week   Predicted Duration of Therapy Intervention (days/wks) 3 days   Planned Therapy Interventions (PT) balance  "training;bed mobility training;gait training;home exercise program;patient/family education;strengthening;transfer training   Risk & Benefits of therapy have been explained evaluation/treatment results reviewed;care plan/treatment goals reviewed;risks/benefits reviewed;current/potential barriers reviewed;participants included;patient   Clinical Impression Comments Pt is well below baseline mobility, unsafe to return home at this time & will need continued skilled therapies in TCU to progress safety and independence with mobility toward baseline.   PT Discharge Planning    PT Discharge Recommendation (DC Rec) Transitional Care Facility   PT Rationale for DC Rec Pt needing strong Ax2 for transfers, far below baseline mobility & will need continnued skilled therapies in TCU to progress safety and indpendence with mobility toward baseline   PT Brief overview of current status  Ax2 bed>WC, WC>commode, and commode>WC.   [facility] [employer] TM \"6 Clicks\"   2016, Trustees of [facility], under license to [employer].  All rights reserved.   6 Clicks Short Forms Basic Mobility Inpatient Short Form   [facility]  \"6 Clicks\" V.2 Basic Mobility Inpatient Short Form   1. Turning from your back to your side while in a flat bed without using bedrails? 2 - A Lot   2. Moving from lying on your back to sitting on the side of a flat bed without using bedrails? 2 - A Lot   3. Moving to and from a bed to a chair (including a wheelchair)? 2 - A Lot   4. Standing up from a chair using your arms (e.g., wheelchair, or bedside chair)? 2 - A Lot   5. To walk in hospital room? 1 - Total   6. Climbing 3-5 steps with a railing? 1 - Total   Basic Mobility Raw Score (Score out of 24.Lower scores equate to lower levels of function) 10   Total Evaluation Time   Total Evaluation Time (Minutes) 9     "

## 2020-10-16 NOTE — PROGRESS NOTES
Care Management Follow Up Note    Length of Stay (days) 3    Patient plan of care discussed at Interdisciplinary Rounds: yes  Expected Discharge Date: 10/17/20(TCU)  Concerns to be Addressed:    Insurance auth required for TCU     Anticipated Discharge Disposition:  TCU  Anticipated Discharge Services:  TCU  Anticipated Discharge DME:  N/A    Plan:    Walker Jain has sent for [employer] auth today and will call SW when they have a response..     KILLIAN Bowen   [facility]  [phone]

## 2020-10-16 NOTE — PLAN OF CARE
Pt A&Ox 4. VSS on 2L at HS of NEDRA. CMS intact except baseline neuropathy. Dressing c/d/i. Sling on. Voiding via purewich. Turn side to side overnight. Pain managed with PRN dilaudid x1. Plan for discharge to tcu when placement found. Will continue to monitor.

## 2020-10-16 NOTE — PROGRESS NOTES
[facility]    Hospitalist Progress Note    Brief Summary:  Mila Kumar is a 65 year old female admitted on 10/13/2020. She presents for elective right total shoulder arthroplasty secondary to complete rotator cuff tear.  Past medical history includes well-controlled hypertension, GERD, anemia secondary to iron and B12 deficiencies, morbid obesity, mild intermittent asthma, stage III CKD, NEDRA, diastolic CHF and history of lacunar infarction without residual deficit.    Assessment & Plan        S/p Right total shoulder arthroplasty 2/2 complete rotator cuff tear:  Tolerated the surgery well, further management as per Orthopedics.        Hypertension  BP well managed at home  on amlodipine, Lasix,  metoprolol XL, clonidine. --  Continue to hold amlodipine at this time.    Continue Metoprolol, lasix  and Clonidine with holding parameters.  Blood pressure is well control at this time.    if SBP>150 will restart the amlodipine          CKD stage III  . Baseline creatinine 1.3-1.5, Avoid  NSAIDS due to her CKD.   --avoid nephrotoxic meds  Creatinine 1.35, restart home lasix.      Diabetes mellitus, type II with neuropathy  Diet/exercise controlled.  Most recent hemoglobin A1c 5.3% on 6/9/2020.  --Monitor glucose 4 times daily in the postoperative period with low correction coverage  --Continue gabapentin  Diabetes remain in good control.      Open wound on right great toe   She typically follows with Dr. Reno of podiatry.  Wound on her great toe, there is no purulent drainage and tissue appeared healthy, antibiotics are not indicated.  --continue to monitor at this time.      NEDRA  Did not tolerate BiPAP/CPAP due to discomfort, she is now on 2L supplemental oxygen at night.  --Continuous pulse oximetry night  --High risk for hypoventilation syndrome in the setting of morbid obesity, underlying NEDRA and narcotic use postoperative pain  Continue to monitor.      Anxiety; MDD  Has  "followed with psychiatry at [facility] in the past for potential PTSD.  Anxiety now well controlled on Cymbalta, and PRN Xanax for panic attacks.  --Hold Xanax in the setting of narcotic use to prevent oversedation     Hypothyroidism  Recent TSH 4.59 on 9/2020  --Continue levothyroxine     Anemia 2/2 iron and B12 deficiencies  Baseline hemoglobin 10-11.  Receives cyanocobalamin injections every 30 days.  --     Mild intermittent asthma; allergies  Well-controlled on Flovent and as needed albuterol.    --Continue PTA Flovent, Astelin, Allegra and as needed albuterol     Overactive bladder  Typically has urinary frequency and urgency, follows with Dr. Thornton of urology.   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  --PT/OT     Morbid obesity s/p gastric bypass, 1991, Hx of compulsive overeating disorder  BMI greater than 40, has followed with the [facility].  She has been demonstrating continued weight loss. Has a hx of dysphasia in the past with \"eating large amounts of food\", counseled to eat small amounts.   --Monitor     GERD  --Antiemetics available, continue Pepcid     Polyarticular gout  Followed by Dr. Brannon with rheumatology.  Gout is maintained on allopurinol.  Occasionally takes prednisone burst packs due to flareups.   --Continue allopurinol     Hx of multiple lacunar infarcts  Incidentally noted on MRI brain August 2019.  Aspirin 81 mg held 7 days prior to surgery.  --ASA restarted per surgery, continue Crestor     Myofascial pain syndrome  Followed by  with PM&R and receiving trigger point injections.    Patient diabetes is in good control.  Hemodynamically remain stable.           DVT Prophylaxis: aspirin 325 mg as per orthopedic   Code Status: Full Code    Disposition: " Expected discharge as per orthopedic.     Deshaun Pop MD  Text Page  (7am - 6pm)    Interval History   No active complaints today     No other significant event overnight.     -Data reviewed today: I reviewed all new labs and imaging results over the last 24 hours. I personally reviewed no images or EKG's today.    Physical Exam   Temp: 99.5  F (37.5  C) Temp src: Oral BP: 119/76 Pulse: 101   Resp: 16 SpO2: 93 % O2 Device: None (Room air) Oxygen Delivery: 2 LPM  Vitals:    10/13/20 0920   Weight: 131 kg (288 lb 12.8 oz)     Vital Signs with Ranges  Temp:  [98.2  F (36.8  C)-99.5  F (37.5  C)] 99.5  F (37.5  C)  Pulse:  [] 101  Resp:  [14-16] 16  BP: (118-136)/(71-76) 119/76  SpO2:  [93 %-97 %] 93 %  I/O last 3 completed shifts:  In: 490 [P.O.:490]  Out: 2050 [Urine:2050]    Constitutional: fatigued  Eyes: Lids and lashes normal, pupils equal, round and reactive to light, extra ocular muscles intact, sclera clear, conjunctiva normal  Respiratory: No increased work of breathing, good air exchange, clear to auscultation bilaterally, no crackles or wheezing  Cardiovascular: Normal apical impulse, regular rate and rhythm, normal S1 and S2, no S3 or S4, and no murmur noted  GI: No scars, normal bowel sounds, soft, non-distended, non-tender, no masses palpated, no hepatosplenomegally  Skin: no bruising or bleeding  Musculoskeletal: no lower extremity pitting edema present  Neurologic: no focal deficit.     Medications       allopurinol  300 mg Oral QAM     [Held by provider] amLODIPine  10 mg Oral QAM     aspirin  325 mg Oral Daily     calcium citrate  950 mg Oral At Bedtime     cloNIDine  0.1 mg Oral QAM     cloNIDine  0.2 mg Oral At Bedtime     DULoxetine  60 mg Oral BID     ferrous gluconate  324 mg Oral At Bedtime     furosemide  20 mg Oral QAM     gabapentin  900 mg Oral At Bedtime     insulin aspart  1-3 Units Subcutaneous TID AC     insulin aspart  1-3 Units Subcutaneous At Bedtime     levothyroxine  112 mcg  Oral QAM AC     metoprolol succinate ER  50 mg Oral At Bedtime     multivitamin w/minerals  1 tablet Oral QAM     vitamin B6  50 mg Oral TID     rosuvastatin  20 mg Oral QAM     sodium chloride (PF)  3 mL Intracatheter Q8H     tolterodine  2 mg Oral BID     vitamin D3  50 mcg Oral QAM       Data   Recent Labs   Lab 10/15/20  0645 10/14/20  0654 10/13/20  0908   HGB 9.7* 10.1*  --    NA  --  137  --    POTASSIUM  --  4.9 4.7   CHLORIDE  --  107  --    CO2  --  28  --    BUN  --  21  --    CR  --  1.35* 1.51*   ANIONGAP  --  2*  --    DONTRELL  --  8.8  --    * 126* 109*       No results found for this or any previous visit (from the past 24 hour(s)).

## 2020-10-16 NOTE — DISCHARGE SUMMARY
Discharge Summary    Mila Kumar MRN# [id number]   YOB: 1955 Age: 65 year old     Date of Admission:  10/13/2020  Date of Discharge:  10/17/2020  5:47 PM  Admitting Physician:  Gurmeet Pacheco MD  Discharge Physician:  Gurmeet Pacheco MD     Primary Provider: Coleen Sanabria          Admission Diagnoses:   Right shoulder cuff tear arthropathy          Discharge Diagnosis:   Same           Surgical Procedure:   Procedure(s):  RIGHT REVERSE TOTAL SHOULDER ARTHROPLASTY       Secondary Diagnosis:     Patient Active Problem List   Diagnosis     HTN (hypertension)     GERD (gastroesophageal reflux disease)     Anemia     B12 deficiency     Morbid obesity (H)     Mild intermittent asthma     Stage III chronic kidney disease     NEDRA (obstructive sleep apnea)     Lacunar infarction (H)     CHF (congestive heart failure) (H)     Anxiety     IBS (irritable bowel syndrome)     Eating disorder     Lactose intolerance     Fatty liver     PCOS (polycystic ovarian syndrome)     Hypothyroidism     Hypoxemia     Simple chronic bronchitis (H)     Dysphasia     Myofascial pain syndrome     DJD (degenerative joint disease)     Gout     Abdominal hernia     Nephrolithiasis     Gouty arthropathy     Neuropathy, peripheral     Charcot's joint disease due to secondary diabetes (H)     S/P gastric bypass     Renal cyst, acquired, right     Microalbuminuria     Charcot ankle     Tenosynovitis     Chronic, continuous use of opioids     Vitamin D deficiency     TMJ dysfunction     Hyperlipidemia     Displacement of lumbar intervertebral disc without myelopathy     Carpal tunnel syndrome     Pap smear abnormality of cervix     Spinal stenosis, lumbar region without neurogenic claudication     History of diabetes mellitus     Encounter for long-term (current) use of medications     Chronic low back pain     Ischemic cardiomyopathy     LVH (left ventricular hypertrophy)     Elevated PTHrP level     Migraine      Cubital tunnel syndrome     Major depressive disorder, recurrent (H)     Multiple lacunar infarcts (H)     IFG (impaired fasting glucose)     Acne vulgaris     Anorgasmia of female     Dizziness     Dysrhythmia     Enthesopathy of hip     Ganglion cyst of wrist     Hearing loss     Hyperplastic polyp of intestine     Impingement syndrome of left shoulder     Lateral epicondylitis of right elbow     Learning disorder     Onychomycosis     Pes planus     Neurogenic bladder     Nontoxic multinodular goiter     Tobacco dependence in remission     Trochanteric bursitis of left hip     History of right salpingo-oophorectomy     History of lumbar surgery     S/P reverse total shoulder arthroplasty, right              Discharge Disposition:     Discharged to short-term care facility           Medications Prior to Admission:     Medications Prior to Admission   Medication Sig Dispense Refill Last Dose     albuterol (PROAIR HFA/PROVENTIL HFA/VENTOLIN HFA) 108 (90 Base) MCG/ACT inhaler Inhale 2 puffs into the lungs every 6 hours   10/10/2020 at PRN     albuterol (PROVENTIL) (2.5 MG/3ML) 0.083% neb solution Take 2.5 mg by nebulization every 4 hours as needed for shortness of breath / dyspnea or wheezing   10/11/2020 at PRN     allopurinol (ZYLOPRIM) 300 MG tablet Take 300 mg by mouth every morning   10/13/2020 at AM     amLODIPine (NORVASC) 10 MG tablet Take 10 mg by mouth every morning   10/13/2020 at AM     aspirin 81 MG EC tablet Take 81 mg by mouth every morning   10/11/2020 at AM     calcium citrate (CITRACAL) 950 MG tablet Take 0.5 tablets by mouth 2 times daily   10/12/2020 at HS     cloNIDine (CATAPRES) 0.1 MG tablet Take 0.1 mg by mouth every morning (in addition to 2 X 0.1 mg bedtime dose)   10/13/2020 at AM     cloNIDine (CATAPRES) 0.1 MG tablet Take 0.2 mg by mouth At Bedtime (2 X 0.1mg)  (in addition to morning dose)   10/12/2020 at HS     cyanocobalamin (CYANOCOBALAMIN) 1000 MCG/ML injection Inject 1 mL into  the muscle every 30 days   Past Month at AM     DULoxetine (CYMBALTA) 60 MG capsule Take 60 mg by mouth 2 times daily   10/13/2020 at AM     famotidine (PEPCID) 20 MG tablet Take 20 mg by mouth 2 times daily as needed   10/9/2020 at PRN     Ferrous Gluconate 324 (37.5 Fe) MG TABS Take 1 tablet by mouth At Bedtime   10/9/2020 at HS     fexofenadine (ALLEGRA) 180 MG tablet Take 180 mg by mouth daily as needed for allergies   10/10/2020 at PRN     fluticasone (FLONASE) 50 MCG/ACT nasal spray Spray 2 sprays into both nostrils daily as needed for rhinitis or allergies   10/10/2020 at PRN     fluticasone (FLOVENT HFA) 220 MCG/ACT inhaler Inhale 1 puff into the lungs daily as needed   10/12/2020 at AM     furosemide (LASIX) 20 MG tablet Take 20 mg by mouth every morning   10/12/2020 at AM     gabapentin (NEURONTIN) 300 MG capsule Take 900 mg by mouth At Bedtime (3 X 300mg)   10/12/2020 at HS     lactase (LACTAID) 3000 UNIT tablet Take 9,000 Units by mouth 3 times daily as needed for indigestion   Past Month at PRN     levothyroxine (SYNTHROID/LEVOTHROID) 112 MCG tablet Take 112 mcg by mouth every morning   10/13/2020 at AM     lidocaine (LIDODERM) 5 % patch Place 1 patch onto the skin daily as needed for moderate pain To prevent lidocaine toxicity, patient should be patch free for 12 hrs daily.   Past Month at PRN     loperamide (IMODIUM A-D) 2 MG tablet Take 2-4 mg by mouth 4 times daily as needed for diarrhea (max 8 tablets/24 hours)   Past Month at PRN     methocarbamol (ROBAXIN) 500 MG tablet Take 500 mg by mouth 2 times daily as needed for muscle spasms   10/12/2020 at PRN     metoprolol succinate ER (TOPROL-XL) 50 MG 24 hr tablet Take 50 mg by mouth At Bedtime   10/12/2020 at HS     Multiple Vitamins-Minerals (WOMENS MULTI VITAMIN & MINERAL) TABS Take 1 tablet by mouth every morning   10/12/2020 at AM     ondansetron (ZOFRAN) 4 MG tablet Take 4 mg by mouth every 8 hours as needed for nausea   Past Month at PRN      rosuvastatin (CRESTOR) 20 MG tablet Take 20 mg by mouth every morning   10/13/2020 at AM     trolamine salicylate (ASPERCREME) 10 % external cream Apply topically 2 times daily as needed for moderate pain   10/12/2020 at AM     trospium (SANCTURA) 20 MG tablet Take 20 mg by mouth 2 times daily   10/13/2020 at AM     vitamin B6 (PYRIDOXINE) 50 MG TABS Take 50 mg by mouth 3 times daily   10/12/2020 at HS     vitamin D3 (CHOLECALCIFEROL) 50 mcg (2000 units) tablet Take 1 tablet by mouth every morning   10/10/2020 at AM     acetaminophen-codeine (TYLENOL #3) 300-30 MG tablet Take 1 tablet by mouth every 6 hours as needed for severe pain   More than a month at PRN     ALPRAZolam (XANAX) 0.5 MG tablet Take 0.5 mg by mouth 3 times daily as needed for anxiety   More than a month at PRN     azelastine (ASTELIN) 0.1 % nasal spray Spray 1 spray into both nostrils daily as needed for rhinitis or allergies   More than a month at PRN     melatonin 5 MG tablet Take 5 mg by mouth nightly as needed for sleep   More than a month at PRN     sennosides (SENOKOT) 8.6 MG tablet Take 2 tablets by mouth daily as needed for constipation   More than a month at PRN             Discharge Medications:     Current Facility-Administered Medications   Medication     albuterol (PROAIR HFA/PROVENTIL HFA/VENTOLIN HFA) 108 (90 Base) MCG/ACT inhaler 2 puff     albuterol (PROVENTIL) neb solution 2.5 mg     allopurinol (ZYLOPRIM) tablet 300 mg     [Held by provider] amLODIPine (NORVASC) tablet 10 mg     aspirin (ASA) EC tablet 325 mg     azelastine (ASTELIN) nasal spray 1 spray     benzocaine-menthol (CHLORASEPTIC) 6-10 MG lozenge 1 lozenge     bisacodyl (DULCOLAX) Suppository 10 mg     calcium carbonate (TUMS) chewable tablet 1,000 mg     [Held by provider] calcium citrate (CITRACAL) tablet 950 mg     cloNIDine (CATAPRES) tablet 0.1 mg     cloNIDine (CATAPRES) tablet 0.2 mg     glucose gel 15-30 g    Or     dextrose 50 % injection 25-50 mL    Or      glucagon injection 1 mg     docusate sodium (COLACE) capsule 100 mg     DULoxetine (CYMBALTA) DR capsule 60 mg     [Held by provider] ferrous gluconate (FERGON) tablet 324 mg     fexofenadine (ALLEGRA) tablet 180 mg     fluticasone (ARNUITY ELLIPTA) 200 MCG/ACT inhaler 1 puff     fluticasone (FLONASE) 50 MCG/ACT spray 2 spray     furosemide (LASIX) tablet 20 mg     gabapentin (NEURONTIN) capsule 900 mg     HYDROmorphone (DILAUDID) tablet 2 mg    Or     HYDROmorphone (DILAUDID) tablet 4 mg     HYDROmorphone (PF) (DILAUDID) injection 0.2 mg    Or     HYDROmorphone (PF) (DILAUDID) injection 0.4 mg     insulin aspart (NovoLOG) injection (RAPID ACTING)     insulin aspart (NovoLOG) injection (RAPID ACTING)     lactase (LACTAID) tablet 9,000 Units     levothyroxine (SYNTHROID/LEVOTHROID) tablet 112 mcg     lidocaine (LMX4) cream     lidocaine 1 % 0.1-1 mL     loperamide (IMODIUM) capsule 2-4 mg     magnesium hydroxide (MILK OF MAGNESIA) suspension 30 mL     methocarbamol (ROBAXIN) tablet 500 mg     metoprolol succinate ER (TOPROL-XL) 24 hr tablet 50 mg     [Held by provider] multivitamin w/minerals (THERA-VIT-M) tablet 1 tablet     ondansetron (ZOFRAN-ODT) ODT tab 4 mg    Or     ondansetron (ZOFRAN) injection 4 mg     ondansetron (ZOFRAN) tablet 4 mg     polyethylene glycol (MIRALAX) Packet 17 g     prochlorperazine (COMPAZINE) injection 5 mg    Or     prochlorperazine (COMPAZINE) tablet 5 mg     [Held by provider] pyridOXINE (VITAMIN B-6) tablet 50 mg     rosuvastatin (CRESTOR) tablet 20 mg     sennosides (SENOKOT) tablet 2 tablet     sodium chloride (PF) 0.9% PF flush 3 mL     sodium chloride (PF) 0.9% PF flush 3 mL     sodium phosphate (FLEET ENEMA) 1 enema     tolterodine (DETROL) tablet 2 mg     [Held by provider] vitamin D3 (CHOLECALCIFEROL) tablet 50 mcg             Consultations:     Consultation during this admission received from Hospitalist service           Hospital Course:     The patient was admitted after  the surgical procedure. Procedure(s): RIGHT REVERSE TOTAL SHOULDER ARTHROPLASTY. The patient underwent an uneventful  RIGHT REVERSE TOTAL SHOULDER ARTHROPLASTY. Postoperatively, anticoagulation  ASA.  Transfusion was not  required. The patient will be discharged to short-term care facility. Home medications have been reconciled. Dilaudid 2 mg was prescribed for pain. No anticoagulation  will be prescribed. Patient is on chronic ASA for lacunar infarcts.               Pending Results:     None           Discharge Instructions and Follow-Up:          Discharge activity: NWB on right arm   Discharge follow-up: Follow up with Dr Gurmeet Pacheco in 14 days   Outpatient therapy: None    Home Care agency: None    Supplies and equipment: None        Wound care: Keep dressing on until follow up appointment with Dr Pacheco   Other instructions: None

## 2020-10-16 NOTE — PROGRESS NOTES
"SPIRITUAL HEALTH SERVICES Progress Note  [facility]    Pt expressed feelings of anxiety about the prospect of transfer to a care facility rather than returning to her own home. Pt states t\hat she does not believe her stay in care facility will be limited in duration because \"they're never temporary.\" Pt indicated that he wish is \"just to return to my home.\" Pt said that Yazdanism and prayer \"don't help much these days\" as they have in the past.    Pt accepted the offer of prayer for her eventual return to her home. Provided emotional and spiritual support through active listening and reflective conversation and prayer.    Pt is aware of the availability of  services on request.    Follow-up as needed and per pt request.      Taj Thomas  Chaplain Resident   "

## 2020-10-17 ENCOUNTER — APPOINTMENT (OUTPATIENT)
Dept: OCCUPATIONAL THERAPY | Facility: CLINIC | Age: 65
DRG: 483 | End: 2020-10-17
Attending: ORTHOPAEDIC SURGERY
Payer: COMMERCIAL

## 2020-10-17 VITALS
BODY MASS INDEX: 42.78 KG/M2 | HEIGHT: 69 IN | OXYGEN SATURATION: 93 % | DIASTOLIC BLOOD PRESSURE: 66 MMHG | TEMPERATURE: 98.6 F | SYSTOLIC BLOOD PRESSURE: 109 MMHG | WEIGHT: 288.8 LBS | RESPIRATION RATE: 16 BRPM | HEART RATE: 98 BPM

## 2020-10-17 LAB
GLUCOSE BLDC GLUCOMTR-MCNC: 114 MG/DL (ref 70–99)
GLUCOSE BLDC GLUCOMTR-MCNC: 133 MG/DL (ref 70–99)

## 2020-10-17 PROCEDURE — 999N001017 HC STATISTIC GLUCOSE BY METER IP

## 2020-10-17 PROCEDURE — 99232 SBSQ HOSP IP/OBS MODERATE 35: CPT | Performed by: INTERNAL MEDICINE

## 2020-10-17 PROCEDURE — 250N000013 HC RX MED GY IP 250 OP 250 PS 637: Performed by: NURSE PRACTITIONER

## 2020-10-17 PROCEDURE — 97110 THERAPEUTIC EXERCISES: CPT | Mod: GO | Performed by: OCCUPATIONAL THERAPIST

## 2020-10-17 PROCEDURE — 250N000013 HC RX MED GY IP 250 OP 250 PS 637: Performed by: ORTHOPAEDIC SURGERY

## 2020-10-17 RX ORDER — ALPRAZOLAM 0.5 MG
0.5 TABLET ORAL 3 TIMES DAILY PRN
Qty: 5 TABLET | Refills: 0 | Status: SHIPPED | OUTPATIENT
Start: 2020-10-17

## 2020-10-17 RX ADMIN — CLONIDINE HYDROCHLORIDE 0.1 MG: 0.1 TABLET ORAL at 08:14

## 2020-10-17 RX ADMIN — ROSUVASTATIN CALCIUM 20 MG: 20 TABLET, FILM COATED ORAL at 08:14

## 2020-10-17 RX ADMIN — HYDROMORPHONE HYDROCHLORIDE 4 MG: 2 TABLET ORAL at 12:18

## 2020-10-17 RX ADMIN — HYDROMORPHONE HYDROCHLORIDE 4 MG: 2 TABLET ORAL at 17:22

## 2020-10-17 RX ADMIN — TOLTERODINE TARTRATE 2 MG: 1 TABLET, FILM COATED ORAL at 08:14

## 2020-10-17 RX ADMIN — ALLOPURINOL 300 MG: 300 TABLET ORAL at 08:13

## 2020-10-17 RX ADMIN — Medication 50 MG: at 08:14

## 2020-10-17 RX ADMIN — LEVOTHYROXINE SODIUM 112 MCG: 112 TABLET ORAL at 07:39

## 2020-10-17 RX ADMIN — Medication 50 MCG: at 08:13

## 2020-10-17 RX ADMIN — FUROSEMIDE 20 MG: 20 TABLET ORAL at 08:12

## 2020-10-17 RX ADMIN — DULOXETINE HYDROCHLORIDE 60 MG: 60 CAPSULE, DELAYED RELEASE ORAL at 08:13

## 2020-10-17 RX ADMIN — Medication 50 MG: at 17:22

## 2020-10-17 RX ADMIN — HYDROMORPHONE HYDROCHLORIDE 4 MG: 2 TABLET ORAL at 07:39

## 2020-10-17 RX ADMIN — MULTIPLE VITAMINS W/ MINERALS TAB 1 TABLET: TAB at 08:14

## 2020-10-17 RX ADMIN — HYDROMORPHONE HYDROCHLORIDE 4 MG: 2 TABLET ORAL at 01:24

## 2020-10-17 RX ADMIN — ASPIRIN 325 MG: 325 TABLET, COATED ORAL at 08:12

## 2020-10-17 ASSESSMENT — ACTIVITIES OF DAILY LIVING (ADL)
ADLS_ACUITY_SCORE: 20

## 2020-10-17 NOTE — PLAN OF CARE
Patient A&Ox4. VSS on 2L NC overnight. Dressing and CMS intact. Arm sling in place. Pain managed with PO dilaudid. Adequate urine output. Blood glucose monitoring. Blood sugars 126 and 114 overnight. Patient slept between cares. Will continue to monitor.

## 2020-10-17 NOTE — PROGRESS NOTES
Ortho  POD#4  AVSS  Doing well overall  Dressing dry  Good digital ROM  No calf pain  No new labs  ASA for DVT prophylaxis  OK for discharge when placement confirmed    Liz Lea

## 2020-10-17 NOTE — PLAN OF CARE
Pt A&O X4. VSS on RA. BGs WDL. Up with A2 to chair and BSC. Managing pain with po dilaudid. Reviewed AVS with pt, discharging to TCU.

## 2020-10-17 NOTE — PLAN OF CARE
OT: following discussion with RN will hold PM OT session as pt is discharging to TCU at 1730. Also discussed with RN that there was an error in the Orders that listed WB as tolerated rather than the NWB that was listed in surgeon notes.

## 2020-10-17 NOTE — PROGRESS NOTES
Care Management Discharge Note    Discharge Planning:  Expected Discharge Date: 10/17/20  Expected Time of Departure: 17:30  Concerns to be Addressed: discharge planning       Anticipated Discharge Disposition: Skilled Nursing Facilty  Anticipated Discharge Services:  therapy  Anticipated Discharge DME:  [facility]    Patient/family educated on Medicare website which has current facility and service quality ratings: yes  Referrals Placed by CM/SW: Post Acute Facilities  Education Provided on the Discharge Plan:  yes  Patient/Family in Agreement with the Plan: yes     Disposition Comments:      Selected Continued Care - Admitted Since 10/13/2020     Destination Coordination complete    Service Provider Selected Services Address Phone Fax Patient Preferred Last Updated    [facility] [address] [facility] [id number] [phone] -- Sana Monterroso, [facility] 10/17/2020 1128                Additional Information:  Received discharge orders for patient.  Received a call back from [facility].  They have received pre-auth from [employer] therefore patient can admit today.  Patient will need transport arranged and she has her own wheelchair.  She has MA as a supplement therefore the transport should be covered under her insurance.  Call placed to [facility] to arrange for wheelchair transport at 17:30 today. Patient informed of the plan and in agreement to the plan.  Call placed to update Jan Yepez and faxed the orders and the PAS.    PAS-RR    D: Per DHS regulation, NIKHIL completed and submitted PAS-RR to [place] [employer] on [employer] Direct Connect via the [employer] LinkAge Line.  PAS-RR confirmation # is : [id number].    I: NIKHIL spoke with patient and they are aware a PAS-RR has been submitted.  NIKHIL reviewed with patient that they may be contacted for a follow up appointment within 10 days of hospital discharge if their SNF stay is < 30 days.  Contact information for Senior  LinkAge Line was also provided.    A: Patient verbalized understanding.    P: Further questions may be directed to Senior LinkAge Line at #[phone], option #4 for [facility]- staff.        AYDEE Reyes, [facility]  Lead   [phone]  [facility]

## 2020-10-17 NOTE — PROGRESS NOTES
[facility]    Hospitalist Progress Note    Brief Summary:  Mila Kumar is a 65 year old female admitted on 10/13/2020. She presents for elective right total shoulder arthroplasty secondary to complete rotator cuff tear.  Past medical history includes well-controlled hypertension, GERD, anemia secondary to iron and B12 deficiencies, morbid obesity, mild intermittent asthma, stage III CKD, NEDRA, diastolic CHF and history of lacunar infarction without residual deficit.    Assessment & Plan        S/p Right total shoulder arthroplasty 2/2 complete rotator cuff tear:  Tolerated the surgery well, further management as per Orthopedics.        Hypertension  BP well managed at home  on amlodipine, Lasix,  metoprolol XL, clonidine. --  Continue to hold amlodipine at this time.    Continue Metoprolol, lasix  and Clonidine with holding parameters.  Blood pressure is well control at this time.   Resume home medications on discharge.         CKD stage III  . Baseline creatinine 1.3-1.5, Avoid  NSAIDS due to her CKD.   --avoid nephrotoxic meds  Creatinine 1.35, restart home lasix.      Diabetes mellitus, type II with neuropathy  Diet/exercise controlled.  Most recent hemoglobin A1c 5.3% on 6/9/2020.  --Monitor glucose 4 times daily in the postoperative period with low correction coverage  --Continue gabapentin  Diabetes remain in good control.      Open wound on right great toe   She typically follows with Dr. Reno of podiatry.  Wound on her great toe, there is no purulent drainage and tissue appeared healthy, antibiotics are not indicated.  --continue to monitor at this time.      NEDRA  Did not tolerate BiPAP/CPAP due to discomfort, she is now on 2L supplemental oxygen at night.  --Continuous pulse oximetry night  --High risk for hypoventilation syndrome in the setting of morbid obesity, underlying NEDRA and narcotic use postoperative pain  Continue to monitor.      Anxiety; MDD  Has  "followed with psychiatry at [facility] in the past for potential PTSD.  Anxiety now well controlled on Cymbalta, and PRN Xanax for panic attacks.  --Hold Xanax in the setting of narcotic use to prevent oversedation     Hypothyroidism  Recent TSH 4.59 on 9/2020  --Continue levothyroxine     Anemia 2/2 iron and B12 deficiencies  Baseline hemoglobin 10-11.  Receives cyanocobalamin injections every 30 days.  --     Mild intermittent asthma; allergies  Well-controlled on Flovent and as needed albuterol.    --Continue PTA Flovent, Astelin, Allegra and as needed albuterol     Overactive bladder  Typically has urinary frequency and urgency, follows with Dr. Thornton of urology.   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  --PT/OT     Morbid obesity s/p gastric bypass, 1991, Hx of compulsive overeating disorder  BMI greater than 40, has followed with the [facility].  She has been demonstrating continued weight loss. Has a hx of dysphasia in the past with \"eating large amounts of food\", counseled to eat small amounts.   --Monitor     GERD  --Antiemetics available, continue Pepcid     Polyarticular gout  Followed by Dr. Brannon with rheumatology.  Gout is maintained on allopurinol.  Occasionally takes prednisone burst packs due to flareups.   --Continue allopurinol     Hx of multiple lacunar infarcts  Incidentally noted on MRI brain August 2019.  Aspirin 81 mg held 7 days prior to surgery.  --ASA restarted per surgery, continue Crestor     Myofascial pain syndrome  Followed by  with PM&R and receiving trigger point injections.    Going to TCU today, medically stable to be discharge at this time.           DVT Prophylaxis: aspirin 325 mg as per orthopedic   Code Status: Full Code    Disposition: Expected " discharge as per orthopedic.     Deshaun Pop MD  Text Page  (7am - 6pm)    Interval History   Patient offer no complaints to me this morning.    No other significant event overnight.     -Data reviewed today: I reviewed all new labs and imaging results over the last 24 hours. I personally reviewed no images or EKG's today.    Physical Exam   Temp: 98.6  F (37  C) Temp src: Oral BP: 109/66 Pulse: 98   Resp: 16 SpO2: 93 % O2 Device: None (Room air) Oxygen Delivery: 2 LPM  Vitals:    10/13/20 0920   Weight: 131 kg (288 lb 12.8 oz)     Vital Signs with Ranges  Temp:  [97.9  F (36.6  C)-98.8  F (37.1  C)] 98.6  F (37  C)  Pulse:  [] 98  Resp:  [14-16] 16  BP: (106-113)/(62-73) 109/66  SpO2:  [93 %-97 %] 93 %  I/O last 3 completed shifts:  In: 360 [P.O.:360]  Out: 400 [Urine:400]    Constitutional: fatigued  Eyes: Lids and lashes normal, pupils equal, round and reactive to light, extra ocular muscles intact, sclera clear, conjunctiva normal  Respiratory: No increased work of breathing, good air exchange, clear to auscultation bilaterally, no crackles or wheezing  Cardiovascular: Normal apical impulse, regular rate and rhythm, normal S1 and S2, no S3 or S4, and no murmur noted  GI: No scars, normal bowel sounds, soft, non-distended, non-tender, no masses palpated, no hepatosplenomegally  Skin: no bruising or bleeding  Musculoskeletal: no lower extremity pitting edema present  Neurologic: no focal deficit.     Medications       allopurinol  300 mg Oral QAM     [Held by provider] amLODIPine  10 mg Oral QAM     aspirin  325 mg Oral Daily     calcium citrate  950 mg Oral At Bedtime     cloNIDine  0.1 mg Oral QAM     cloNIDine  0.2 mg Oral At Bedtime     DULoxetine  60 mg Oral BID     ferrous gluconate  324 mg Oral At Bedtime     furosemide  20 mg Oral QAM     gabapentin  900 mg Oral At Bedtime     insulin aspart  1-3 Units Subcutaneous TID AC     insulin aspart  1-3 Units Subcutaneous At Bedtime     levothyroxine  112  mcg Oral QAM AC     metoprolol succinate ER  50 mg Oral At Bedtime     multivitamin w/minerals  1 tablet Oral QAM     vitamin B6  50 mg Oral TID     rosuvastatin  20 mg Oral QAM     sodium chloride (PF)  3 mL Intracatheter Q8H     tolterodine  2 mg Oral BID     vitamin D3  50 mcg Oral QAM       Data   Recent Labs   Lab 10/15/20  0645 10/14/20  0654 10/13/20  0908   HGB 9.7* 10.1*  --    NA  --  137  --    POTASSIUM  --  4.9 4.7   CHLORIDE  --  107  --    CO2  --  28  --    BUN  --  21  --    CR  --  1.35* 1.51*   ANIONGAP  --  2*  --    DONTRELL  --  8.8  --    * 126* 109*       No results found for this or any previous visit (from the past 24 hour(s)).

## 2020-10-18 NOTE — PLAN OF CARE
Occupational Therapy Discharge Summary    Reason for therapy discharge:    Discharged to transitional care facility.    Progress towards therapy goal(s). See goals on Care Plan in [facility] electronic health record for goal details.  Goals partially met.  Barriers to achieving goals:   limited tolerance for therapy and discharge from facility.    Therapy recommendation(s):    Continued therapy is recommended.  Rationale/Recommendations:   .Pt. has made progress in occupational therapy towards functional goals, but has not yet fully met goals, and is not yet safe and I with ADLs at this time. Recommend continued occupational therapy in a TCU setting to address functional deficits.

## 2020-10-18 NOTE — PLAN OF CARE
Physical Therapy Discharge Summary    Reason for therapy discharge:    Discharged to transitional care facility.    Progress towards therapy goal(s). See goals on Care Plan in [facility] electronic health record for goal details.  Goals partially met.  Barriers to achieving goals:   discharge from facility.    Therapy recommendation(s):    Continued therapy is recommended.  Rationale/Recommendations:  To further increase independence with mobility.

## 2020-10-19 ENCOUNTER — NURSING HOME VISIT (OUTPATIENT)
Dept: GERIATRICS | Facility: CLINIC | Age: 65
End: 2020-10-19
Payer: COMMERCIAL

## 2020-10-19 VITALS
RESPIRATION RATE: 18 BRPM | TEMPERATURE: 98.3 F | SYSTOLIC BLOOD PRESSURE: 99 MMHG | DIASTOLIC BLOOD PRESSURE: 54 MMHG | OXYGEN SATURATION: 96 % | WEIGHT: 287.4 LBS | HEIGHT: 69 IN | HEART RATE: 104 BPM | BODY MASS INDEX: 42.57 KG/M2

## 2020-10-19 DIAGNOSIS — F41.9 ANXIETY DISORDER, UNSPECIFIED TYPE: ICD-10-CM

## 2020-10-19 DIAGNOSIS — R47.02 DYSPHASIA: ICD-10-CM

## 2020-10-19 DIAGNOSIS — K59.03 DRUG-INDUCED CONSTIPATION: ICD-10-CM

## 2020-10-19 DIAGNOSIS — E78.5 HYPERLIPIDEMIA, UNSPECIFIED HYPERLIPIDEMIA TYPE: ICD-10-CM

## 2020-10-19 DIAGNOSIS — D50.8 OTHER IRON DEFICIENCY ANEMIA: ICD-10-CM

## 2020-10-19 DIAGNOSIS — N18.32 STAGE 3B CHRONIC KIDNEY DISEASE (H): ICD-10-CM

## 2020-10-19 DIAGNOSIS — M79.18 MYOFASCIAL PAIN SYNDROME: ICD-10-CM

## 2020-10-19 DIAGNOSIS — E03.9 HYPOTHYROIDISM, UNSPECIFIED TYPE: ICD-10-CM

## 2020-10-19 DIAGNOSIS — M10.00 IDIOPATHIC GOUT, UNSPECIFIED CHRONICITY, UNSPECIFIED SITE: ICD-10-CM

## 2020-10-19 DIAGNOSIS — Z96.611 S/P REVERSE TOTAL SHOULDER ARTHROPLASTY, RIGHT: Primary | ICD-10-CM

## 2020-10-19 DIAGNOSIS — K21.9 GASTROESOPHAGEAL REFLUX DISEASE, UNSPECIFIED WHETHER ESOPHAGITIS PRESENT: ICD-10-CM

## 2020-10-19 DIAGNOSIS — N32.81 OVERACTIVE BLADDER: ICD-10-CM

## 2020-10-19 DIAGNOSIS — I10 ESSENTIAL HYPERTENSION: ICD-10-CM

## 2020-10-19 DIAGNOSIS — K58.9 IRRITABLE BOWEL SYNDROME, UNSPECIFIED TYPE: ICD-10-CM

## 2020-10-19 DIAGNOSIS — I50.32 CHRONIC DIASTOLIC CONGESTIVE HEART FAILURE (H): ICD-10-CM

## 2020-10-19 DIAGNOSIS — G47.33 OSA (OBSTRUCTIVE SLEEP APNEA): ICD-10-CM

## 2020-10-19 DIAGNOSIS — J45.20 MILD INTERMITTENT ASTHMA, UNSPECIFIED WHETHER COMPLICATED: ICD-10-CM

## 2020-10-19 PROBLEM — G56.02 LEFT CARPAL TUNNEL SYNDROME: Status: ACTIVE | Noted: 2019-05-20

## 2020-10-19 PROBLEM — D50.9 IRON DEFICIENCY ANEMIA: Status: ACTIVE | Noted: 2020-10-19

## 2020-10-19 PROBLEM — F32.A DEPRESSIVE DISORDER: Status: ACTIVE | Noted: 2018-10-23

## 2020-10-19 PROBLEM — G56.21 CUBITAL TUNNEL SYNDROME, RIGHT: Status: ACTIVE | Noted: 2018-05-07

## 2020-10-19 PROBLEM — R79.89 ELEVATED PARATHYROID HORMONE RELATED PEPTIDE LEVEL: Status: ACTIVE | Noted: 2017-10-05

## 2020-10-19 PROCEDURE — 99309 SBSQ NF CARE MODERATE MDM 30: CPT | Performed by: NURSE PRACTITIONER

## 2020-10-19 ASSESSMENT — MIFFLIN-ST. JEOR: SCORE: 1908.02

## 2020-10-19 NOTE — PROGRESS NOTES
[facility]  PRIMARY CARE PROVIDER AND CLINIC: Coleen Sanabria MD, [address] [address] / [place] [address]; Phone: [phone]  Chief Complaint   Patient presents with     Hospital F/U     [facility] Record Number: [id number]  Place of Service where encounter took place: [facility] ([facility] [[id number]]    Mila Kumar is a 65 year old (1955), admitted to the above facility from  [facility]. Hospital stay 10/13/20 through 10/17/20.. Admitted to this facility for rehab, medical management and nursing care.    HPI:    HPI information obtained from: facility chart records, patient report and [facility] chart review.   Brief Summary of Hospital Course: Elective Right Reverse Total Shoulder on 10/13/20 with Dr. Pacheco for complete rotator cuff tear. No concerns noted in post-op course at discharge. Transfusion not required.   Updates on Status Since Skilled nursing Admission:   Several active problems including hypertension, GERD, anemia secondary to iron and B12 deficiencies,  mild intermittent asthma, stage III CKD, NEDRA, diastolic CHF and history of lacunar infarction without residual deficit, chronic pain. In her room dangling on bedside. States pain well controlled. She is eating lunch and appears somewhat sleepy. Noted with lower BPs and elevated HR with 12 am VS. All morning antihypertensives were given (no hold parameters).  Denies abdominal pain, SOB (says use of supplemental oxygen only at nights). With sleepiness toady and use of narcotic pain medication will have it available prn to maintain sats >90%. Had some concerns noted in hospital about her right great toe. Skin intact, no redness or drainage.     CODE STATUS/ADVANCE DIRECTIVES DISCUSSION: CPR/Full code   Patient's living condition: in community   ALLERGIES: Amoxicillin-pot clavulanate, Contrast dye, Diagnostic x-ray materials, Salsalate, and Sulfa  drugs  PAST MEDICAL HISTORY:  has a past medical history of Abdominal hernia, Acne vulgaris, Allergic state, Anemia, Anorgasmia of female, Anxiety, B12 deficiency, Carpal tunnel syndrome, Charcot ankle, Charcot's joint disease due to secondary diabetes (H), CHF (congestive heart failure) (H), Chronic low back pain, Chronic, continuous use of opioids, Cubital tunnel syndrome, Diabetes (H), Displacement of lumbar intervertebral disc without myelopathy, Dizziness, DJD (degenerative joint disease), Dysphasia, Dysrhythmia, Eating disorder, Elevated PTHrP level, Encounter for long-term (current) use of medications, Enthesopathy of hip, Fatty liver, Ganglion cyst of wrist, Gastric bypass status for obesity (1994), GERD (gastroesophageal reflux disease), Gout, Gouty arthropathy, Hearing loss, History of diabetes mellitus, History of lumbar surgery, History of right salpingo-oophorectomy, HTN (hypertension), Hyperlipidemia, Hyperplastic polyp of intestine, Hypothyroidism, Hypoxemia, IBS (irritable bowel syndrome), IFG (impaired fasting glucose), Impingement syndrome of left shoulder, Ischemic cardiomyopathy, Lactose intolerance, Lacunar infarction (H), Lateral epicondylitis of right elbow, Learning disorder, LVH (left ventricular hypertrophy), Major depressive disorder, recurrent (H), Microalbuminuria, Migraine, Mild intermittent asthma, Morbid obesity (H), Multiple lacunar infarcts (H), Myofascial pain syndrome, Nephrolithiasis, Neurogenic bladder, Neuropathy, peripheral, Nontoxic multinodular goiter, Onychomycosis, NEDRA (obstructive sleep apnea), Pap smear abnormality of cervix, PCOS (polycystic ovarian syndrome), Pes planus, Renal cyst, acquired, right, S/P gastric bypass, Simple chronic bronchitis (H), Sleep apnea, Spinal stenosis, lumbar region without neurogenic claudication, Stage III chronic kidney disease, Tenosynovitis, TMJ dysfunction, Tobacco dependence in remission, Trochanteric bursitis of left hip, and Vitamin  D deficiency.  PAST SURGICAL HISTORY:   has a past surgical history that includes GYN surgery; orthopedic surgery; and GI surgery.  FAMILY HISTORY: family history is not on file.  SOCIAL HISTORY:   reports that she quit smoking about 29 years ago. She has a 44.00 pack-year smoking history. She has never used smokeless tobacco. She reports previous alcohol use. She reports that she does not use drugs.    Post Discharge Medication Reconciliation Status: discharge medications reconciled and changed, per note/orders    Current Outpatient Medications   Medication Sig Dispense Refill     acetaminophen-codeine (TYLENOL #3) 300-30 MG tablet Take 1 tablet by mouth every 6 hours as needed for severe pain 10 tablet 0     albuterol (PROAIR HFA/PROVENTIL HFA/VENTOLIN HFA) 108 (90 Base) MCG/ACT inhaler Inhale 2 puffs into the lungs every 6 hours       albuterol (PROVENTIL) (2.5 MG/3ML) 0.083% neb solution Take 2.5 mg by nebulization every 4 hours as needed for shortness of breath / dyspnea or wheezing       allopurinol (ZYLOPRIM) 300 MG tablet Take 300 mg by mouth every morning       ALPRAZolam (XANAX) 0.5 MG tablet Take 1 tablet (0.5 mg) by mouth 3 times daily as needed for anxiety 5 tablet 0     amLODIPine (NORVASC) 10 MG tablet Take 10 mg by mouth every morning       aspirin 81 MG EC tablet Take 81 mg by mouth every morning       azelastine (ASTELIN) 0.1 % nasal spray Spray 1 spray into both nostrils daily as needed for rhinitis or allergies       calcium citrate (CITRACAL) 950 MG tablet Take 0.5 tablets by mouth 2 times daily       cloNIDine (CATAPRES) 0.1 MG tablet Take 0.1 mg by mouth every morning (in addition to 2 X 0.1 mg bedtime dose)       cloNIDine (CATAPRES) 0.1 MG tablet Take 0.2 mg by mouth At Bedtime (2 X 0.1mg)  (in addition to morning dose)       cyanocobalamin (CYANOCOBALAMIN) 1000 MCG/ML injection Inject 1 mL into the muscle every 30 days       DULoxetine (CYMBALTA) 60 MG capsule Take 60 mg by mouth 2 times  daily       famotidine (PEPCID) 20 MG tablet Take 20 mg by mouth 2 times daily as needed       Ferrous Gluconate 324 (37.5 Fe) MG TABS Take 1 tablet by mouth At Bedtime       fexofenadine (ALLEGRA) 180 MG tablet Take 180 mg by mouth daily as needed for allergies       fluticasone (FLONASE) 50 MCG/ACT nasal spray Spray 2 sprays into both nostrils daily as needed for rhinitis or allergies       fluticasone (FLOVENT HFA) 220 MCG/ACT inhaler Inhale 1 puff into the lungs daily as needed       furosemide (LASIX) 20 MG tablet Take 20 mg by mouth every morning       gabapentin (NEURONTIN) 300 MG capsule Take 900 mg by mouth At Bedtime (3 X 300mg)       HYDROmorphone (DILAUDID) 2 MG tablet Take 1-2 tablets (2-4 mg) by mouth every 6 hours as needed for pain 30 tablet 0     lactase (LACTAID) 3000 UNIT tablet Take 9,000 Units by mouth 3 times daily as needed for indigestion       levothyroxine (SYNTHROID/LEVOTHROID) 112 MCG tablet Take 112 mcg by mouth every morning       lidocaine (LIDODERM) 5 % patch Place 1 patch onto the skin daily as needed for moderate pain To prevent lidocaine toxicity, patient should be patch free for 12 hrs daily.       loperamide (IMODIUM A-D) 2 MG tablet Take 2-4 mg by mouth 4 times daily as needed for diarrhea (max 8 tablets/24 hours)       melatonin 5 MG tablet Take 5 mg by mouth nightly as needed for sleep       methocarbamol (ROBAXIN) 500 MG tablet Take 500 mg by mouth 2 times daily as needed for muscle spasms       metoprolol succinate ER (TOPROL-XL) 50 MG 24 hr tablet Take 50 mg by mouth At Bedtime       Multiple Vitamins-Minerals (WOMENS MULTI VITAMIN & MINERAL) TABS Take 1 tablet by mouth every morning       ondansetron (ZOFRAN) 4 MG tablet Take 4 mg by mouth every 8 hours as needed for nausea       rosuvastatin (CRESTOR) 20 MG tablet Take 20 mg by mouth every morning       sennosides (SENOKOT) 8.6 MG tablet Take 2 tablets by mouth daily as needed for constipation       trolamine salicylate  "(ASPERCREME) 10 % external cream Apply topically 2 times daily as needed for moderate pain       trospium (SANCTURA) 20 MG tablet Take 20 mg by mouth 2 times daily       vitamin B6 (PYRIDOXINE) 50 MG TABS Take 50 mg by mouth 3 times daily       vitamin D3 (CHOLECALCIFEROL) 50 mcg (2000 units) tablet Take 1 tablet by mouth every morning       ROS:  4 point ROS including Respiratory, CV, GI and , other than that noted in the HPI,  is negative    Vitals:  BP 99/54   Pulse 104   Temp 98.3  F (36.8  C)   Resp 18   Ht 1.753 m (5' 9\")   Wt 130.4 kg (287 lb 6.4 oz)   SpO2 96%   BMI 42.44 kg/m    Exam:  GENERAL APPEARANCE:  cooperative, sleepy, dangling bedside   ENT:  Mouth and posterior oropharynx normal, dry mucous membranes, normal hearing acuity, dentures left at home per notes  EYES:  EOM, conjunctivae, lids, pupils and irises normal  RESP:  lungs clear to auscultation , distant and diminished throughout on room air  CV:  Palpation and auscultation of heart done , regular rate and rhythm, no murmur, rub, or gallop, trace edema to legs, ankles, feet  ABDOMEN:  large, rounded, hypoactive bowel sounds   M/S:   States walks at home, scooter for mobility. Sling in place for RUE, radial pulses +1, good hand/  strength, no swelling-Charcot joints baseline   SKIN:  skin intact to visualized areas. Right shoulder with dressing slight shadowing shows on dressing. Skin intact to toes, dry   NEURO:   Cranial nerves 2-12 are normal tested and grossly at patient's baseline  PSYCH:  oriented X 3    Lab/Diagnostic data:  CBC RESULTS:   Recent Labs   Lab Test 10/15/20  0645 10/14/20  0654 06/24/17  1221   WBC  --   --  8.1   RBC  --   --  3.76*   HGB 9.7* 10.1* 12.1   HCT  --   --  36.9   MCV  --   --  98   MCH  --   --  32.2   MCHC  --   --  32.8   RDW  --   --  15.3*   PLT  --   --  231       Last Basic Metabolic Panel:  Recent Labs   Lab Test 10/15/20  0645 10/14/20  0654 10/13/20  0908 06/24/17  1221   NA  --  137  --  " 138   POTASSIUM  --  4.9 4.7 4.5   CHLORIDE  --  107  --  108   DONTRELL  --  8.8  --  8.8   CO2  --  28  --  25   BUN  --  21  --  22   CR  --  1.35* 1.51* 1.28*   * 126* 109* 80       Liver Function Studies -   Recent Labs   Lab Test 06/24/17  1221   PROTTOTAL 7.3   ALBUMIN 3.1*   BILITOTAL 0.3   ALKPHOS 97   AST 11   ALT 23       TSH of 9/17/2020: 4.59    Lab Results   Component Value Date    A1C 5.5 10/13/2020       ASSESSMENT/PLAN:  (Z96.611) S/P reverse total shoulder arthroplasty, right  (primary encounter diagnosis)  (M79.18) Myofascial pain syndrome  Comment: acute on chronic post surgery. Has been using prn Dilaudid. Concern for sedation as sleepy with today's visit. Followed by  with PM&R and receives trigger point injections for pain syndrome. Motorize WC for distance and ambulates around her home is baseline   Plan:   -Dilaudid prn q6H prn   -Tylenol #3 on hold for TCU admission   -Vitamin D for bone health  -aspercreme  Topical BID  prn   -robaxin 500 mg po BID prn   -lidocaine patch daily prn   -gabapentin 900 mg po daily at HS   -duloxetine 60 mg po BID  -calcium citrate BID for bone health-chart review says postmenopause   -ASA restarted post surgery for DVT prophylaxis. Ortho stated 325 mg po daily. Was on 81 mg po daily. Will move back to 325 mg po daily and have ortho address at follow up on 10/23/20  -dressing to stay in place until follow up   -NWB on right arm and sling in place-somewhat ill fitting with change in positions   -CBC on 10/20/20  -PT/OT    (I10) Essential hypertension  (I50.32) Chronic diastolic congestive heart failure (H)  (E78.5) Hyperlipidemia, unspecified hyperlipidemia type  Comment: BP lower HR elevated 2/2 dehydration? Hx of multiple lacunar infarcts   Plan:   -Norvasc reduced to 5 mg po daily and hold for SBP <110  -Rosuvastatin moved to HS  -metoprolol ER 50 mg po daily (given at HS?)   -lasix 20 mg po daily (given today will hold for tomorrow)   -clonidine  0.1 mg po every morning and 0.2 mg po every night. Hold for SBP <110  -was on ASA 81 mg po daily for lacunar infarcts. Held 7 days prior to surgery. Ortho put back at 325 mg po daily  -Daily weights/ VS and dose adjust antihypertensives prn   -BMP on 10/20/20    (D50.8) Other iron deficiency anemia  Comment: Vitamin B and iron deficiency. Baseline hemoglobin 10-11.  Receives cyanocobalamin injections every 30 days.  Plan:   -vitamin B6 50 mg po TID  -iron supplement daily at HS  -CBC 10/20/20     (J45.20) Mild intermittent asthma, unspecified whether complicated  (G47.33) NEDRA (obstructive sleep apnea)  Comment: does not tolerate CPAP  Plan:   -Proair 2 puff q6H  -albuterol nebs q4H prn   -fluticasone into lungs daily   -flonase daily prn   -fexofenadine daily prn   -astelin nasal daily prn   -supplemental oxygen prn days and use nights     (K21.9) Gastroesophageal reflux disease, unspecified whether esophagitis present  (R47.02) Dysphasia  Comment: denies today  Plan:   -Zofran 4 mg po q8H prn   -Pepcid 20 mg po BID prn     (K58.9) Irritable bowel syndrome, unspecified type  (K59.03) Drug-induced constipation  Comment: concern post-surgery patient/supplemental iron. Did not want scheduled   Plan:   -Senokot 2 tabs by mouth daily prn   -monitor for bowel movement   -loperamide daily prn for diarrhea    (N18.32) Stage 3b chronic kidney disease  Comment: chronic-baseline 1.3-1.5   Plan:   -renal dose medications, avoid nephrotoxins  -BMP on 10/20/20    (F41.9) Anxiety Disorder  Comment: no s/s of anxiety. Medication was on hold with last hospital. Has followed with psychiatry at [facility] in the past for potential PTSD.  Anxiety now well controlled on Cymbalta, and PRN Xanax for panic attacks.   Plan:  -Xanax 0.5 mg po TID  -consider ACP  -melatonin 5 mg po daily prn at HS for insomnia   -duloxetine 60 mg po BID    (M10.00) Idiopathic gout, unspecified chronicity, unspecified site  Comment: stable-Followed  by Dr. Brannon with rheumatology.  Gout is maintained on allopurinol.  Occasionally takes prednisone burst packs due to flareups.   Plan:  -allopurinol 300 mg po daily  -Next Rheumatology is 12/1/20     (N32.81) Overactive bladder  Comment: stable  Plan:  -continue trospium 20 mg po daily     (E03.9) Hypothyroidism, unspecified type  Comment: stable   Plan   -continue current levothyroxine               Electronically signed by:  CAROLINA Tyler CNP

## 2020-10-19 NOTE — LETTER
10/19/2020        RE: Mila Kumar  [address]  [address]  [facility] [address]        [facility]  PRIMARY CARE PROVIDER AND CLINIC: Coleen Sanabria MD, [facility] [address] / [place] [address]; Phone: [phone]  Chief Complaint   Patient presents with     Hospital F/U     [place] [facility] Record Number: [id number]  Place of Service where encounter took place: [facility], [facility] ([facility]) [[id number]]    Mila Kumar is a 65 year old (1955), admitted to the above facility from  [facility]. Hospital stay 10/13/20 through 10/17/20.. Admitted to this facility for rehab, medical management and nursing care.    HPI:    HPI information obtained from: facility chart records, patient report and [facility] chart review.   Brief Summary of Hospital Course: Elective Right Reverse Total Shoulder on 10/13/20 with Dr. Pacheco for complete rotator cuff tear. No concerns noted in post-op course at discharge. Transfusion not required.   Updates on Status Since Skilled nursing Admission:   Several active problems including hypertension, GERD, anemia secondary to iron and B12 deficiencies,  mild intermittent asthma, stage III CKD, NEDRA, diastolic CHF and history of lacunar infarction without residual deficit, chronic pain. In her room dangling on bedside. States pain well controlled. She is eating lunch and appears somewhat sleepy. Noted with lower BPs and elevated HR with 12 am VS. All morning antihypertensives were given (no hold parameters).  Denies abdominal pain, SOB (says use of supplemental oxygen only at nights). With sleepiness toady and use of narcotic pain medication will have it available prn to maintain sats >90%. Had some concerns noted in hospital about her right great toe. Skin intact, no redness or drainage.     CODE STATUS/ADVANCE DIRECTIVES DISCUSSION: CPR/Full code   Patient's living condition: in  community   ALLERGIES: Amoxicillin-pot clavulanate, Contrast dye, Diagnostic x-ray materials, Salsalate, and Sulfa drugs  PAST MEDICAL HISTORY:  has a past medical history of Abdominal hernia, Acne vulgaris, Allergic state, Anemia, Anorgasmia of female, Anxiety, B12 deficiency, Carpal tunnel syndrome, Charcot ankle, Charcot's joint disease due to secondary diabetes (H), CHF (congestive heart failure) (H), Chronic low back pain, Chronic, continuous use of opioids, Cubital tunnel syndrome, Diabetes (H), Displacement of lumbar intervertebral disc without myelopathy, Dizziness, DJD (degenerative joint disease), Dysphasia, Dysrhythmia, Eating disorder, Elevated PTHrP level, Encounter for long-term (current) use of medications, Enthesopathy of hip, Fatty liver, Ganglion cyst of wrist, Gastric bypass status for obesity (1994), GERD (gastroesophageal reflux disease), Gout, Gouty arthropathy, Hearing loss, History of diabetes mellitus, History of lumbar surgery, History of right salpingo-oophorectomy, HTN (hypertension), Hyperlipidemia, Hyperplastic polyp of intestine, Hypothyroidism, Hypoxemia, IBS (irritable bowel syndrome), IFG (impaired fasting glucose), Impingement syndrome of left shoulder, Ischemic cardiomyopathy, Lactose intolerance, Lacunar infarction (H), Lateral epicondylitis of right elbow, Learning disorder, LVH (left ventricular hypertrophy), Major depressive disorder, recurrent (H), Microalbuminuria, Migraine, Mild intermittent asthma, Morbid obesity (H), Multiple lacunar infarcts (H), Myofascial pain syndrome, Nephrolithiasis, Neurogenic bladder, Neuropathy, peripheral, Nontoxic multinodular goiter, Onychomycosis, NEDRA (obstructive sleep apnea), Pap smear abnormality of cervix, PCOS (polycystic ovarian syndrome), Pes planus, Renal cyst, acquired, right, S/P gastric bypass, Simple chronic bronchitis (H), Sleep apnea, Spinal stenosis, lumbar region without neurogenic claudication, Stage III chronic kidney  disease, Tenosynovitis, TMJ dysfunction, Tobacco dependence in remission, Trochanteric bursitis of left hip, and Vitamin D deficiency.  PAST SURGICAL HISTORY:   has a past surgical history that includes GYN surgery; orthopedic surgery; and GI surgery.  FAMILY HISTORY: family history is not on file.  SOCIAL HISTORY:   reports that she quit smoking about 29 years ago. She has a 44.00 pack-year smoking history. She has never used smokeless tobacco. She reports previous alcohol use. She reports that she does not use drugs.    Post Discharge Medication Reconciliation Status: discharge medications reconciled and changed, per note/orders    Current Outpatient Medications   Medication Sig Dispense Refill     acetaminophen-codeine (TYLENOL #3) 300-30 MG tablet Take 1 tablet by mouth every 6 hours as needed for severe pain 10 tablet 0     albuterol (PROAIR HFA/PROVENTIL HFA/VENTOLIN HFA) 108 (90 Base) MCG/ACT inhaler Inhale 2 puffs into the lungs every 6 hours       albuterol (PROVENTIL) (2.5 MG/3ML) 0.083% neb solution Take 2.5 mg by nebulization every 4 hours as needed for shortness of breath / dyspnea or wheezing       allopurinol (ZYLOPRIM) 300 MG tablet Take 300 mg by mouth every morning       ALPRAZolam (XANAX) 0.5 MG tablet Take 1 tablet (0.5 mg) by mouth 3 times daily as needed for anxiety 5 tablet 0     amLODIPine (NORVASC) 10 MG tablet Take 10 mg by mouth every morning       aspirin 81 MG EC tablet Take 81 mg by mouth every morning       azelastine (ASTELIN) 0.1 % nasal spray Spray 1 spray into both nostrils daily as needed for rhinitis or allergies       calcium citrate (CITRACAL) 950 MG tablet Take 0.5 tablets by mouth 2 times daily       cloNIDine (CATAPRES) 0.1 MG tablet Take 0.1 mg by mouth every morning (in addition to 2 X 0.1 mg bedtime dose)       cloNIDine (CATAPRES) 0.1 MG tablet Take 0.2 mg by mouth At Bedtime (2 X 0.1mg)  (in addition to morning dose)       cyanocobalamin (CYANOCOBALAMIN) 1000 MCG/ML  injection Inject 1 mL into the muscle every 30 days       DULoxetine (CYMBALTA) 60 MG capsule Take 60 mg by mouth 2 times daily       famotidine (PEPCID) 20 MG tablet Take 20 mg by mouth 2 times daily as needed       Ferrous Gluconate 324 (37.5 Fe) MG TABS Take 1 tablet by mouth At Bedtime       fexofenadine (ALLEGRA) 180 MG tablet Take 180 mg by mouth daily as needed for allergies       fluticasone (FLONASE) 50 MCG/ACT nasal spray Spray 2 sprays into both nostrils daily as needed for rhinitis or allergies       fluticasone (FLOVENT HFA) 220 MCG/ACT inhaler Inhale 1 puff into the lungs daily as needed       furosemide (LASIX) 20 MG tablet Take 20 mg by mouth every morning       gabapentin (NEURONTIN) 300 MG capsule Take 900 mg by mouth At Bedtime (3 X 300mg)       HYDROmorphone (DILAUDID) 2 MG tablet Take 1-2 tablets (2-4 mg) by mouth every 6 hours as needed for pain 30 tablet 0     lactase (LACTAID) 3000 UNIT tablet Take 9,000 Units by mouth 3 times daily as needed for indigestion       levothyroxine (SYNTHROID/LEVOTHROID) 112 MCG tablet Take 112 mcg by mouth every morning       lidocaine (LIDODERM) 5 % patch Place 1 patch onto the skin daily as needed for moderate pain To prevent lidocaine toxicity, patient should be patch free for 12 hrs daily.       loperamide (IMODIUM A-D) 2 MG tablet Take 2-4 mg by mouth 4 times daily as needed for diarrhea (max 8 tablets/24 hours)       melatonin 5 MG tablet Take 5 mg by mouth nightly as needed for sleep       methocarbamol (ROBAXIN) 500 MG tablet Take 500 mg by mouth 2 times daily as needed for muscle spasms       metoprolol succinate ER (TOPROL-XL) 50 MG 24 hr tablet Take 50 mg by mouth At Bedtime       Multiple Vitamins-Minerals (WOMENS MULTI VITAMIN & MINERAL) TABS Take 1 tablet by mouth every morning       ondansetron (ZOFRAN) 4 MG tablet Take 4 mg by mouth every 8 hours as needed for nausea       rosuvastatin (CRESTOR) 20 MG tablet Take 20 mg by mouth every morning    "    sennosides (SENOKOT) 8.6 MG tablet Take 2 tablets by mouth daily as needed for constipation       trolamine salicylate (ASPERCREME) 10 % external cream Apply topically 2 times daily as needed for moderate pain       trospium (SANCTURA) 20 MG tablet Take 20 mg by mouth 2 times daily       vitamin B6 (PYRIDOXINE) 50 MG TABS Take 50 mg by mouth 3 times daily       vitamin D3 (CHOLECALCIFEROL) 50 mcg (2000 units) tablet Take 1 tablet by mouth every morning       ROS:  4 point ROS including Respiratory, CV, GI and , other than that noted in the HPI,  is negative    Vitals:  BP 99/54   Pulse 104   Temp 98.3  F (36.8  C)   Resp 18   Ht 1.753 m (5' 9\")   Wt 130.4 kg (287 lb 6.4 oz)   SpO2 96%   BMI 42.44 kg/m    Exam:  GENERAL APPEARANCE:  cooperative, sleepy, dangling bedside   ENT:  Mouth and posterior oropharynx normal, dry mucous membranes, normal hearing acuity, dentures left at home per notes  EYES:  EOM, conjunctivae, lids, pupils and irises normal  RESP:  lungs clear to auscultation , distant and diminished throughout on room air  CV:  Palpation and auscultation of heart done , regular rate and rhythm, no murmur, rub, or gallop, trace edema to legs, ankles, feet  ABDOMEN:  large, rounded, hypoactive bowel sounds   M/S:   States walks at home, scooter for mobility. Sling in place for RUE, radial pulses +1, good hand/  strength, no swelling-Charcot joints baseline   SKIN:  skin intact to visualized areas. Right shoulder with dressing slight shadowing shows on dressing. Skin intact to toes, dry   NEURO:   Cranial nerves 2-12 are normal tested and grossly at patient's baseline  PSYCH:  oriented X 3    Lab/Diagnostic data:  CBC RESULTS:   Recent Labs   Lab Test 10/15/20  0645 10/14/20  0654 06/24/17  1221   WBC  --   --  8.1   RBC  --   --  3.76*   HGB 9.7* 10.1* 12.1   HCT  --   --  36.9   MCV  --   --  98   MCH  --   --  32.2   MCHC  --   --  32.8   RDW  --   --  15.3*   PLT  --   --  231       Last " Basic Metabolic Panel:  Recent Labs   Lab Test 10/15/20  0645 10/14/20  0654 10/13/20  0908 06/24/17  1221   NA  --  137  --  138   POTASSIUM  --  4.9 4.7 4.5   CHLORIDE  --  107  --  108   DONTRELL  --  8.8  --  8.8   CO2  --  28  --  25   BUN  --  21  --  22   CR  --  1.35* 1.51* 1.28*   * 126* 109* 80       Liver Function Studies -   Recent Labs   Lab Test 06/24/17  1221   PROTTOTAL 7.3   ALBUMIN 3.1*   BILITOTAL 0.3   ALKPHOS 97   AST 11   ALT 23       TSH of 9/17/2020: 4.59    Lab Results   Component Value Date    A1C 5.5 10/13/2020       ASSESSMENT/PLAN:  (Z96.611) S/P reverse total shoulder arthroplasty, right  (primary encounter diagnosis)  (M79.18) Myofascial pain syndrome  Comment: acute on chronic post surgery. Has been using prn Dilaudid. Concern for sedation as sleepy with today's visit. Followed by  with PM&R and receives trigger point injections for pain syndrome. Motorize WC for distance and ambulates around her home is baseline   Plan:   -Dilaudid prn q6H prn   -Tylenol #3 on hold for TCU admission   -Vitamin D for bone health  -aspercreme  Topical BID  prn   -robaxin 500 mg po BID prn   -lidocaine patch daily prn   -gabapentin 900 mg po daily at HS   -duloxetine 60 mg po BID  -calcium citrate BID for bone health-chart review says postmenopause   -ASA restarted post surgery for DVT prophylaxis. Ortho stated 325 mg po daily. Was on 81 mg po daily. Will move back to 325 mg po daily and have ortho address at follow up on 10/23/20  -dressing to stay in place until follow up   -NWB on right arm and sling in place-somewhat ill fitting with change in positions   -CBC on 10/20/20  -PT/OT    (I10) Essential hypertension  (I50.32) Chronic diastolic congestive heart failure (H)  (E78.5) Hyperlipidemia, unspecified hyperlipidemia type  Comment: BP lower HR elevated 2/2 dehydration? Hx of multiple lacunar infarcts   Plan:   -Norvasc reduced to 5 mg po daily and hold for SBP <110  -Rosuvastatin moved  to HS  -metoprolol ER 50 mg po daily (given at HS?)   -lasix 20 mg po daily (given today will hold for tomorrow)   -clonidine 0.1 mg po every morning and 0.2 mg po every night. Hold for SBP <110  -was on ASA 81 mg po daily for lacunar infarcts. Held 7 days prior to surgery. Ortho put back at 325 mg po daily  -Daily weights/ VS and dose adjust antihypertensives prn   -BMP on 10/20/20    (D50.8) Other iron deficiency anemia  Comment: Vitamin B and iron deficiency. Baseline hemoglobin 10-11.  Receives cyanocobalamin injections every 30 days.  Plan:   -vitamin B6 50 mg po TID  -iron supplement daily at HS  -CBC 10/20/20     (J45.20) Mild intermittent asthma, unspecified whether complicated  (G47.33) NEDRA (obstructive sleep apnea)  Comment: does not tolerate CPAP  Plan:   -Proair 2 puff q6H  -albuterol nebs q4H prn   -fluticasone into lungs daily   -flonase daily prn   -fexofenadine daily prn   -astelin nasal daily prn   -supplemental oxygen prn days and use nights     (K21.9) Gastroesophageal reflux disease, unspecified whether esophagitis present  (R47.02) Dysphasia  Comment: denies today  Plan:   -Zofran 4 mg po q8H prn   -Pepcid 20 mg po BID prn     (K58.9) Irritable bowel syndrome, unspecified type  (K59.03) Drug-induced constipation  Comment: concern post-surgery patient/supplemental iron. Did not want scheduled   Plan:   -Senokot 2 tabs by mouth daily prn   -monitor for bowel movement   -loperamide daily prn for diarrhea    (N18.32) Stage 3b chronic kidney disease  Comment: chronic-baseline 1.3-1.5   Plan:   -renal dose medications, avoid nephrotoxins  -BMP on 10/20/20    (F41.9) Anxiety Disorder  Comment: no s/s of anxiety. Medication was on hold with last hospital. Has followed with psychiatry at [facility] in the past for potential PTSD.  Anxiety now well controlled on Cymbalta, and PRN Xanax for panic attacks.   Plan:  -Xanax 0.5 mg po TID  -consider ACP  -melatonin 5 mg po daily prn at HS for  insomnia   -duloxetine 60 mg po BID    (M10.00) Idiopathic gout, unspecified chronicity, unspecified site  Comment: stable-Followed by Dr. Brannon with rheumatology.  Gout is maintained on allopurinol.  Occasionally takes prednisone burst packs due to flareups.   Plan:  -allopurinol 300 mg po daily  -Next Rheumatology is 12/1/20     (N32.81) Overactive bladder  Comment: stable  Plan:  -continue trospium 20 mg po daily     (E03.9) Hypothyroidism, unspecified type  Comment: stable   Plan   -continue current levothyroxine               Electronically signed by:  CAROLINA Tyler CNP           Sincerely,        CAROLINA Tyler CNP

## 2020-10-20 ENCOUNTER — RECORDS - HEALTHEAST (OUTPATIENT)
Dept: LAB | Facility: CLINIC | Age: 65
End: 2020-10-20

## 2020-10-21 LAB
ANION GAP SERPL CALCULATED.3IONS-SCNC: 11 MMOL/L (ref 5–18)
BUN SERPL-MCNC: 34 MG/DL (ref 8–22)
CALCIUM SERPL-MCNC: 9.6 MG/DL (ref 8.5–10.5)
CHLORIDE BLD-SCNC: 103 MMOL/L (ref 98–107)
CO2 SERPL-SCNC: 25 MMOL/L (ref 22–31)
CREAT SERPL-MCNC: 1.84 MG/DL (ref 0.6–1.1)
ERYTHROCYTE [DISTWIDTH] IN BLOOD BY AUTOMATED COUNT: 16.4 % (ref 11–14.5)
GFR SERPL CREATININE-BSD FRML MDRD: 27 ML/MIN/1.73M2
GLUCOSE BLD-MCNC: 87 MG/DL (ref 70–125)
HCT VFR BLD AUTO: 28.7 % (ref 35–47)
HGB BLD-MCNC: 9 G/DL (ref 12–16)
MCH RBC QN AUTO: 32.4 PG (ref 27–34)
MCHC RBC AUTO-ENTMCNC: 31.4 G/DL (ref 32–36)
MCV RBC AUTO: 103 FL (ref 80–100)
PLATELET # BLD AUTO: 455 THOU/UL (ref 140–440)
PMV BLD AUTO: 11 FL (ref 8.5–12.5)
POTASSIUM BLD-SCNC: 4.2 MMOL/L (ref 3.5–5)
RBC # BLD AUTO: 2.78 MILL/UL (ref 3.8–5.4)
SODIUM SERPL-SCNC: 139 MMOL/L (ref 136–145)
WBC: 10.3 THOU/UL (ref 4–11)

## 2020-10-22 ENCOUNTER — NURSING HOME VISIT (OUTPATIENT)
Dept: GERIATRICS | Facility: CLINIC | Age: 65
End: 2020-10-22
Payer: COMMERCIAL

## 2020-10-22 VITALS
TEMPERATURE: 96.8 F | OXYGEN SATURATION: 91 % | BODY MASS INDEX: 42.64 KG/M2 | DIASTOLIC BLOOD PRESSURE: 64 MMHG | HEART RATE: 88 BPM | HEIGHT: 69 IN | SYSTOLIC BLOOD PRESSURE: 109 MMHG | WEIGHT: 287.9 LBS | RESPIRATION RATE: 20 BRPM

## 2020-10-22 DIAGNOSIS — Z96.611 S/P REVERSE TOTAL SHOULDER ARTHROPLASTY, RIGHT: ICD-10-CM

## 2020-10-22 DIAGNOSIS — I10 ESSENTIAL HYPERTENSION: ICD-10-CM

## 2020-10-22 DIAGNOSIS — I50.32 CHRONIC DIASTOLIC CONGESTIVE HEART FAILURE (H): ICD-10-CM

## 2020-10-22 DIAGNOSIS — K58.9 IRRITABLE BOWEL SYNDROME, UNSPECIFIED TYPE: ICD-10-CM

## 2020-10-22 DIAGNOSIS — M79.18 MYOFASCIAL PAIN SYNDROME: Primary | ICD-10-CM

## 2020-10-22 DIAGNOSIS — F41.9 ANXIETY DISORDER, UNSPECIFIED TYPE: ICD-10-CM

## 2020-10-22 DIAGNOSIS — D50.8 OTHER IRON DEFICIENCY ANEMIA: ICD-10-CM

## 2020-10-22 DIAGNOSIS — K59.03 DRUG-INDUCED CONSTIPATION: ICD-10-CM

## 2020-10-22 DIAGNOSIS — R53.81 PHYSICAL DECONDITIONING: ICD-10-CM

## 2020-10-22 DIAGNOSIS — J45.20 MILD INTERMITTENT ASTHMA, UNSPECIFIED WHETHER COMPLICATED: ICD-10-CM

## 2020-10-22 DIAGNOSIS — N18.32 STAGE 3B CHRONIC KIDNEY DISEASE (H): ICD-10-CM

## 2020-10-22 PROCEDURE — 99310 SBSQ NF CARE HIGH MDM 45: CPT | Performed by: NURSE PRACTITIONER

## 2020-10-22 RX ORDER — AMLODIPINE BESYLATE 2.5 MG/1
2.5 TABLET ORAL EVERY MORNING
COMMUNITY
Start: 2020-10-22

## 2020-10-22 RX ORDER — HYDROMORPHONE HYDROCHLORIDE 2 MG/1
2 TABLET ORAL EVERY 6 HOURS PRN
Qty: 30 TABLET | Refills: 0 | COMMUNITY
Start: 2020-10-22 | End: 2020-10-29

## 2020-10-22 RX ORDER — CLONIDINE HYDROCHLORIDE 0.1 MG/1
0.1 TABLET ORAL 2 TIMES DAILY
COMMUNITY
Start: 2020-10-22

## 2020-10-22 ASSESSMENT — MIFFLIN-ST. JEOR: SCORE: 1910.29

## 2020-10-22 NOTE — PROGRESS NOTES
"[facility]  [place] Medical Record Number: [id number]  Place of Service where encounter took place: [facility] ([facility]) [[id number]]  Chief Complaint   Patient presents with     RECHECK       HPI:    Mila Kumar is a 65 year old (1955), who is being seen today for an episodic care visit. HPI information obtained from: facility chart records, facility staff, patient report and [facility] chart review.    Ms. Kumar was visited today while in her room while sitting at the edge of the bed. Reports that she feels comfortable at this time but has pain when the sling is taken off.  She believes that she took dilaudid last night but that is the only time she has taken it (per emar, she has taken three times since admission) \"everyone wants to know about my bowel movements!\" She reports that she has not had a bowel movement since before TCU admission but she is generally not regular at home. She did not sleep well last night. Eating is fair. She would like to go home as her brother flew in from out of town and rented a car so he could stay and take care of her. He is scheduled to stay in [place] until 11/2.   Past Medical and Surgical History reviewed in Epic today.    MEDICATIONS:    Current Outpatient Medications   Medication Sig Dispense Refill     amLODIPine (NORVASC) 2.5 MG tablet Take 1 tablet (2.5 mg) by mouth every morning Hold for SBP <110       cloNIDine (CATAPRES) 0.1 MG tablet Take 1 tablet (0.1 mg) by mouth 2 times daily (in addition to 2 X 0.1 mg bedtime dose)       HYDROmorphone (DILAUDID) 2 MG tablet Take 1 tablet (2 mg) by mouth every 6 hours as needed for pain 30 tablet 0     acetaminophen-codeine (TYLENOL #3) 300-30 MG tablet Take 1 tablet by mouth every 6 hours as needed for severe pain On Hold 10 tablet 0     albuterol (PROAIR HFA/PROVENTIL HFA/VENTOLIN HFA) 108 (90 Base) MCG/ACT inhaler Inhale 2 puffs into the lungs every 6 hours       albuterol (PROVENTIL) " (2.5 MG/3ML) 0.083% neb solution Take 2.5 mg by nebulization every 4 hours as needed for shortness of breath / dyspnea or wheezing       allopurinol (ZYLOPRIM) 300 MG tablet Take 300 mg by mouth every morning       ALPRAZolam (XANAX) 0.5 MG tablet Take 1 tablet (0.5 mg) by mouth 3 times daily as needed for anxiety 5 tablet 0     aspirin 81 MG EC tablet Take 81 mg by mouth every morning       azelastine (ASTELIN) 0.1 % nasal spray Spray 1 spray into both nostrils daily as needed for rhinitis or allergies       calcium citrate (CITRACAL) 950 MG tablet Take 0.5 tablets by mouth 2 times daily       cyanocobalamin (CYANOCOBALAMIN) 1000 MCG/ML injection Inject 1 mL into the muscle every 30 days       DULoxetine (CYMBALTA) 60 MG capsule Take 60 mg by mouth 2 times daily       famotidine (PEPCID) 20 MG tablet Take 20 mg by mouth 2 times daily as needed       Ferrous Gluconate 324 (37.5 Fe) MG TABS Take 1 tablet by mouth At Bedtime       fexofenadine (ALLEGRA) 180 MG tablet Take 180 mg by mouth daily as needed for allergies       fluticasone (FLONASE) 50 MCG/ACT nasal spray Spray 2 sprays into both nostrils daily as needed for rhinitis or allergies       fluticasone (FLOVENT HFA) 220 MCG/ACT inhaler Inhale 1 puff into the lungs daily as needed       furosemide (LASIX) 20 MG tablet Take 20 mg by mouth every morning       gabapentin (NEURONTIN) 300 MG capsule Take 900 mg by mouth At Bedtime (3 X 300mg)       lactase (LACTAID) 3000 UNIT tablet Take 9,000 Units by mouth 3 times daily as needed for indigestion       levothyroxine (SYNTHROID/LEVOTHROID) 112 MCG tablet Take 112 mcg by mouth every morning       lidocaine (LIDODERM) 5 % patch Place 1 patch onto the skin daily as needed for moderate pain To prevent lidocaine toxicity, patient should be patch free for 12 hrs daily.       loperamide (IMODIUM A-D) 2 MG tablet Take 2-4 mg by mouth 4 times daily as needed for diarrhea (max 8 tablets/24 hours)       melatonin 5 MG tablet  "Take 5 mg by mouth nightly as needed for sleep       methocarbamol (ROBAXIN) 500 MG tablet Take 500 mg by mouth 2 times daily as needed for muscle spasms       metoprolol succinate ER (TOPROL-XL) 50 MG 24 hr tablet Take 50 mg by mouth At Bedtime       Multiple Vitamins-Minerals (WOMENS MULTI VITAMIN & MINERAL) TABS Take 1 tablet by mouth every morning       ondansetron (ZOFRAN) 4 MG tablet Take 4 mg by mouth every 8 hours as needed for nausea       rosuvastatin (CRESTOR) 20 MG tablet Take 20 mg by mouth every morning       sennosides (SENOKOT) 8.6 MG tablet Take 2 tablets by mouth daily as needed for constipation       trolamine salicylate (ASPERCREME) 10 % external cream Apply topically 2 times daily as needed for moderate pain       trospium (SANCTURA) 20 MG tablet Take 20 mg by mouth 2 times daily       vitamin B6 (PYRIDOXINE) 50 MG TABS Take 50 mg by mouth 3 times daily       vitamin D3 (CHOLECALCIFEROL) 50 mcg (2000 units) tablet Take 1 tablet by mouth every morning       REVIEW OF SYSTEMS:  4 point ROS including Respiratory, CV, GI and , other than that noted in the HPI,  is negative    Objective:  /64   Pulse 88   Temp 96.8  F (36  C)   Resp 20   Ht 1.753 m (5' 9\")   Wt 130.6 kg (287 lb 14.4 oz)   SpO2 91%   BMI 42.52 kg/m    Exam:  GENERAL APPEARANCE:  Alert, in no distress, pleasant, cooperative, oriented x 4  EYES: no discharge or mattering on lids or lashes noted  ENT:  moist mucous membranes, hearing acuity intact  NECK: supple, symmetrical  RESP: no respiratory distress, Lung sounds clear, patient is on room air  CV: Edema none in bilateral lower extremities. VASCULAR: warm extremities without open areas.  ABDOMEN: obese.   M/S:   Gait and station ambulates independently with cane, sling present to right arm  SKIN:  Inspection and palpation of skin and subcutaneous tissue: skin warm, dry and intact without rashes  NEURO: no facial asymmetry, no speech deficits and able to follow " directions, moves all extremities symmetrically  PSYCH:  insight and judgement intact, memory intact, affect and mood normal    Labs:    SODIUM   139  mmol/L     POTASSIUM   4.2  mmol/L     CHLORIDE   103  mmol/L    CO2   25  mmol/L    ANION GAP, CALCULATION   11  mmol/L  5-18    GLUCOSE   87  mg/dL      CALCIUM   9.6  mg/dL      BLOOD UREA NITROGEN   34  mg/dL  8-22     CREATININE   1.84  mg/dL  0.60-1.10     GFR MDRD AF AMER   33  mL/min/1.73m2      ASSESSMENT/PLAN:  (Z96.611) S/P reverse total shoulder arthroplasty, right  (primary encounter diagnosis)  (M79.18) Myofascial pain syndrome  Comment: acute on chronic post surgery. Has been using prn Dilaudid only three times since admission so will taper off. Followed by  with PM&R and receives trigger point injections for pain syndrome. Motorize WC for distance and ambulates around her home is baseline   Plan:   -decrease Dilaudid to 2 mg q6h PRN   -Tylenol #3 on hold for TCU admission   -Vitamin D for bone health  -aspercreme  Topical BID  prn   -robaxin 500 mg po BID prn   -lidocaine patch daily prn   -gabapentin 900 mg po daily at HS   -duloxetine 60 mg po BID  -ASA restarted post surgery for DVT prophylaxis. Ortho stated 325 mg po daily. Was on 81 mg po daily. Will move back to 325 mg po daily and have ortho address at follow up on 10/23/20  -dressing to stay in place until follow up   -NWB on right arm and sling in place  -PT/OT     (I10) Essential hypertension  (I50.32) Chronic diastolic congestive heart failure (H)  Comment: BP lower earlier this week so amlodipine was decreased and lasix was held for one day as well. BPS still soft with some SBPS in the upper 90s which could cause falls.   Plan:   -Norvasc reduced to 2.5 mg po daily and hold for SBP <110  -metoprolol ER 50 mg po daily  --hold lasix for now until BMP on monday  -clonidine 0.1 mg po every morning and decrease HS dose to 0.1 mg. Hold for SBP <110  -was on ASA 81 mg po daily for lacunar  infarcts. Held 7 days prior to surgery. Ortho put back at 325 mg po daily  -Daily weights/ VS and dose adjust antihypertensives prn   -BMP on 10/26/20    (N18.32) Stage 3b chronic kidney disease  Comment: chronic-baseline 1.3-1.5 but up to 1.87 above the baseline so likely dry  Plan:   --discontinue lasix for now  -renal dose medications, avoid nephrotoxins  -BMP on 10/26/20     (D50.8) Other iron deficiency anemia  Comment: Vitamin B and iron deficiency. Baseline hemoglobin 10-11 and 9.0 here at [facility].  Receives cyanocobalamin injections every 30 days.  Plan:   -vitamin B6 50 mg po TID  -iron supplement daily at HS     (J45.20) Mild intermittent asthma, unspecified whether complicated  Comment: no wheezing today.   Plan:   -Proair 2 puff q6H  -albuterol nebs q4H prn   -fluticasone into lungs daily   -flonase daily prn   -fexofenadine daily prn   -astelin nasal daily prn   -supplemental oxygen prn days and use nights      (K58.9) Irritable bowel syndrome, unspecified type  (K59.03) Drug-induced constipation  Comment: concern post-surgery patient/supplemental iron. Did not want scheduled meds prior but she has not had a BM for several day so will schedule something at bedtime.   Plan:   --sennokot one tablet at HS  -Senokot 2 tabs by mouth daily prn   -monitor for bowel movement   -loperamide daily prn for diarrhea      (F41.9) Anxiety Disorder  Comment: Has followed with psychiatry at [facility] in the past for potential PTSD.  Anxiety now well controlled on Cymbalta, and PRN Xanax for panic attacks.   Plan:  -Xanax 0.5 mg po TID  -melatonin 5 mg po daily prn at HS for insomnia   -duloxetine 60 mg po BID    (R53.81) Physical deconditioning  Comment: secondary to recent hospitalization and underlying medical conditions.   Plan: PT/OT for strengthening. SW for discharge planning.     Electronically signed by:  Hilaria Singleton, CAROLINA CNP

## 2020-10-22 NOTE — LETTER
"    10/22/2020        RE: Mila Kumar  [address]  [address]  [place] [address]        [facility]  [place] Medical Record Number: [id number]  Place of Service where encounter took place: [facility] ([facility]) [[id number]]  Chief Complaint   Patient presents with     RECHECK       HPI:    Mila Kumar is a 65 year old (1955), who is being seen today for an episodic care visit. HPI information obtained from: facility chart records, facility staff, patient report and [facility] chart review.    Ms. Kumar was visited today while in her room while sitting at the edge of the bed. Reports that she feels comfortable at this time but has pain when the sling is taken off.  She believes that she took dilaudid last night but that is the only time she has taken it (per emar, she has taken three times since admission) \"everyone wants to know about my bowel movements!\" She reports that she has not had a bowel movement since before TCU admission but she is generally not regular at home. She did not sleep well last night. Eating is fair. She would like to go home as her brother flew in from out of town and rented a car so he could stay and take care of her. He is scheduled to stay in [place] until 11/2.   Past Medical and Surgical History reviewed in Epic today.    MEDICATIONS:    Current Outpatient Medications   Medication Sig Dispense Refill     amLODIPine (NORVASC) 2.5 MG tablet Take 1 tablet (2.5 mg) by mouth every morning Hold for SBP <110       cloNIDine (CATAPRES) 0.1 MG tablet Take 1 tablet (0.1 mg) by mouth 2 times daily (in addition to 2 X 0.1 mg bedtime dose)       HYDROmorphone (DILAUDID) 2 MG tablet Take 1 tablet (2 mg) by mouth every 6 hours as needed for pain 30 tablet 0     acetaminophen-codeine (TYLENOL #3) 300-30 MG tablet Take 1 tablet by mouth every 6 hours as needed for severe pain On Hold 10 tablet 0     albuterol (PROAIR HFA/PROVENTIL " HFA/VENTOLIN HFA) 108 (90 Base) MCG/ACT inhaler Inhale 2 puffs into the lungs every 6 hours       albuterol (PROVENTIL) (2.5 MG/3ML) 0.083% neb solution Take 2.5 mg by nebulization every 4 hours as needed for shortness of breath / dyspnea or wheezing       allopurinol (ZYLOPRIM) 300 MG tablet Take 300 mg by mouth every morning       ALPRAZolam (XANAX) 0.5 MG tablet Take 1 tablet (0.5 mg) by mouth 3 times daily as needed for anxiety 5 tablet 0     aspirin 81 MG EC tablet Take 81 mg by mouth every morning       azelastine (ASTELIN) 0.1 % nasal spray Spray 1 spray into both nostrils daily as needed for rhinitis or allergies       calcium citrate (CITRACAL) 950 MG tablet Take 0.5 tablets by mouth 2 times daily       cyanocobalamin (CYANOCOBALAMIN) 1000 MCG/ML injection Inject 1 mL into the muscle every 30 days       DULoxetine (CYMBALTA) 60 MG capsule Take 60 mg by mouth 2 times daily       famotidine (PEPCID) 20 MG tablet Take 20 mg by mouth 2 times daily as needed       Ferrous Gluconate 324 (37.5 Fe) MG TABS Take 1 tablet by mouth At Bedtime       fexofenadine (ALLEGRA) 180 MG tablet Take 180 mg by mouth daily as needed for allergies       fluticasone (FLONASE) 50 MCG/ACT nasal spray Spray 2 sprays into both nostrils daily as needed for rhinitis or allergies       fluticasone (FLOVENT HFA) 220 MCG/ACT inhaler Inhale 1 puff into the lungs daily as needed       furosemide (LASIX) 20 MG tablet Take 20 mg by mouth every morning       gabapentin (NEURONTIN) 300 MG capsule Take 900 mg by mouth At Bedtime (3 X 300mg)       lactase (LACTAID) 3000 UNIT tablet Take 9,000 Units by mouth 3 times daily as needed for indigestion       levothyroxine (SYNTHROID/LEVOTHROID) 112 MCG tablet Take 112 mcg by mouth every morning       lidocaine (LIDODERM) 5 % patch Place 1 patch onto the skin daily as needed for moderate pain To prevent lidocaine toxicity, patient should be patch free for 12 hrs daily.       loperamide (IMODIUM A-D) 2 MG  "tablet Take 2-4 mg by mouth 4 times daily as needed for diarrhea (max 8 tablets/24 hours)       melatonin 5 MG tablet Take 5 mg by mouth nightly as needed for sleep       methocarbamol (ROBAXIN) 500 MG tablet Take 500 mg by mouth 2 times daily as needed for muscle spasms       metoprolol succinate ER (TOPROL-XL) 50 MG 24 hr tablet Take 50 mg by mouth At Bedtime       Multiple Vitamins-Minerals (WOMENS MULTI VITAMIN & MINERAL) TABS Take 1 tablet by mouth every morning       ondansetron (ZOFRAN) 4 MG tablet Take 4 mg by mouth every 8 hours as needed for nausea       rosuvastatin (CRESTOR) 20 MG tablet Take 20 mg by mouth every morning       sennosides (SENOKOT) 8.6 MG tablet Take 2 tablets by mouth daily as needed for constipation       trolamine salicylate (ASPERCREME) 10 % external cream Apply topically 2 times daily as needed for moderate pain       trospium (SANCTURA) 20 MG tablet Take 20 mg by mouth 2 times daily       vitamin B6 (PYRIDOXINE) 50 MG TABS Take 50 mg by mouth 3 times daily       vitamin D3 (CHOLECALCIFEROL) 50 mcg (2000 units) tablet Take 1 tablet by mouth every morning       REVIEW OF SYSTEMS:  4 point ROS including Respiratory, CV, GI and , other than that noted in the HPI,  is negative    Objective:  /64   Pulse 88   Temp 96.8  F (36  C)   Resp 20   Ht 1.753 m (5' 9\")   Wt 130.6 kg (287 lb 14.4 oz)   SpO2 91%   BMI 42.52 kg/m    Exam:  GENERAL APPEARANCE:  Alert, in no distress, pleasant, cooperative, oriented x 4  EYES: no discharge or mattering on lids or lashes noted  ENT:  moist mucous membranes, hearing acuity intact  NECK: supple, symmetrical  RESP: no respiratory distress, Lung sounds clear, patient is on room air  CV: Edema none in bilateral lower extremities. VASCULAR: warm extremities without open areas.  ABDOMEN: obese.   M/S:   Gait and station ambulates independently with cane, sling present to right arm  SKIN:  Inspection and palpation of skin and subcutaneous tissue: " skin warm, dry and intact without rashes  NEURO: no facial asymmetry, no speech deficits and able to follow directions, moves all extremities symmetrically  PSYCH:  insight and judgement intact, memory intact, affect and mood normal    Labs:    SODIUM   139  mmol/L     POTASSIUM   4.2  mmol/L     CHLORIDE   103  mmol/L    CO2   25  mmol/L    ANION GAP, CALCULATION   11  mmol/L  5-18    GLUCOSE   87  mg/dL      CALCIUM   9.6  mg/dL      BLOOD UREA NITROGEN   34  mg/dL  8-22     CREATININE   1.84  mg/dL  0.60-1.10     GFR MDRD AF AMER   33  mL/min/1.73m2      ASSESSMENT/PLAN:  (Z96.611) S/P reverse total shoulder arthroplasty, right  (primary encounter diagnosis)  (M79.18) Myofascial pain syndrome  Comment: acute on chronic post surgery. Has been using prn Dilaudid only three times since admission so will taper off. Followed by  with PM&R and receives trigger point injections for pain syndrome. Motorize WC for distance and ambulates around her home is baseline   Plan:   -decrease Dilaudid to 2 mg q6h PRN   -Tylenol #3 on hold for TCU admission   -Vitamin D for bone health  -aspercreme  Topical BID  prn   -robaxin 500 mg po BID prn   -lidocaine patch daily prn   -gabapentin 900 mg po daily at HS   -duloxetine 60 mg po BID  -ASA restarted post surgery for DVT prophylaxis. Ortho stated 325 mg po daily. Was on 81 mg po daily. Will move back to 325 mg po daily and have ortho address at follow up on 10/23/20  -dressing to stay in place until follow up   -NWB on right arm and sling in place  -PT/OT     (I10) Essential hypertension  (I50.32) Chronic diastolic congestive heart failure (H)  Comment: BP lower earlier this week so amlodipine was decreased and lasix was held for one day as well. BPS still soft with some SBPS in the upper 90s which could cause falls.   Plan:   -Norvasc reduced to 2.5 mg po daily and hold for SBP <110  -metoprolol ER 50 mg po daily  --hold lasix for now until BMP on monday  -clonidine 0.1  mg po every morning and decrease HS dose to 0.1 mg. Hold for SBP <110  -was on ASA 81 mg po daily for lacunar infarcts. Held 7 days prior to surgery. Ortho put back at 325 mg po daily  -Daily weights/ VS and dose adjust antihypertensives prn   -BMP on 10/26/20    (N18.32) Stage 3b chronic kidney disease  Comment: chronic-baseline 1.3-1.5 but up to 1.87 above the baseline so likely dry  Plan:   --discontinue lasix for now  -renal dose medications, avoid nephrotoxins  -BMP on 10/26/20     (D50.8) Other iron deficiency anemia  Comment: Vitamin B and iron deficiency. Baseline hemoglobin 10-11 and 9.0 here at [facility].  Receives cyanocobalamin injections every 30 days.  Plan:   -vitamin B6 50 mg po TID  -iron supplement daily at HS     (J45.20) Mild intermittent asthma, unspecified whether complicated  Comment: no wheezing today.   Plan:   -Proair 2 puff q6H  -albuterol nebs q4H prn   -fluticasone into lungs daily   -flonase daily prn   -fexofenadine daily prn   -astelin nasal daily prn   -supplemental oxygen prn days and use nights      (K58.9) Irritable bowel syndrome, unspecified type  (K59.03) Drug-induced constipation  Comment: concern post-surgery patient/supplemental iron. Did not want scheduled meds prior but she has not had a BM for several day so will schedule something at bedtime.   Plan:   --sennokot one tablet at HS  -Senokot 2 tabs by mouth daily prn   -monitor for bowel movement   -loperamide daily prn for diarrhea      (F41.9) Anxiety Disorder  Comment: Has followed with psychiatry at [facility] in the past for potential PTSD.  Anxiety now well controlled on Cymbalta, and PRN Xanax for panic attacks.   Plan:  -Xanax 0.5 mg po TID  -melatonin 5 mg po daily prn at HS for insomnia   -duloxetine 60 mg po BID    (R53.81) Physical deconditioning  Comment: secondary to recent hospitalization and underlying medical conditions.   Plan: PT/OT for strengthening. SW for discharge planning.      Electronically signed by:  CAROLINA Winkler CNP         Sincerely,        CAROLINA Winkler CNP

## 2020-10-25 ENCOUNTER — RECORDS - HEALTHEAST (OUTPATIENT)
Dept: LAB | Facility: CLINIC | Age: 65
End: 2020-10-25

## 2020-10-26 ENCOUNTER — NURSING HOME VISIT (OUTPATIENT)
Dept: GERIATRICS | Facility: CLINIC | Age: 65
End: 2020-10-26
Payer: COMMERCIAL

## 2020-10-26 VITALS
SYSTOLIC BLOOD PRESSURE: 124 MMHG | DIASTOLIC BLOOD PRESSURE: 66 MMHG | RESPIRATION RATE: 18 BRPM | BODY MASS INDEX: 42.06 KG/M2 | HEIGHT: 69 IN | TEMPERATURE: 98.7 F | HEART RATE: 85 BPM | OXYGEN SATURATION: 98 % | WEIGHT: 284 LBS

## 2020-10-26 DIAGNOSIS — F41.9 ANXIETY DISORDER, UNSPECIFIED TYPE: ICD-10-CM

## 2020-10-26 DIAGNOSIS — K59.03 DRUG-INDUCED CONSTIPATION: ICD-10-CM

## 2020-10-26 DIAGNOSIS — R53.81 PHYSICAL DECONDITIONING: ICD-10-CM

## 2020-10-26 DIAGNOSIS — D50.8 OTHER IRON DEFICIENCY ANEMIA: ICD-10-CM

## 2020-10-26 DIAGNOSIS — I10 ESSENTIAL HYPERTENSION: ICD-10-CM

## 2020-10-26 DIAGNOSIS — Z96.611 S/P REVERSE TOTAL SHOULDER ARTHROPLASTY, RIGHT: ICD-10-CM

## 2020-10-26 DIAGNOSIS — N18.32 STAGE 3B CHRONIC KIDNEY DISEASE (H): ICD-10-CM

## 2020-10-26 DIAGNOSIS — K58.9 IRRITABLE BOWEL SYNDROME, UNSPECIFIED TYPE: ICD-10-CM

## 2020-10-26 DIAGNOSIS — J45.20 MILD INTERMITTENT ASTHMA, UNSPECIFIED WHETHER COMPLICATED: ICD-10-CM

## 2020-10-26 DIAGNOSIS — I50.32 CHRONIC DIASTOLIC CONGESTIVE HEART FAILURE (H): ICD-10-CM

## 2020-10-26 DIAGNOSIS — M79.18 MYOFASCIAL PAIN SYNDROME: Primary | ICD-10-CM

## 2020-10-26 LAB
ANION GAP SERPL CALCULATED.3IONS-SCNC: 10 MMOL/L (ref 5–18)
BUN SERPL-MCNC: 25 MG/DL (ref 8–22)
CALCIUM SERPL-MCNC: 9.4 MG/DL (ref 8.5–10.5)
CHLORIDE BLD-SCNC: 106 MMOL/L (ref 98–107)
CO2 SERPL-SCNC: 23 MMOL/L (ref 22–31)
CREAT SERPL-MCNC: 1.65 MG/DL (ref 0.6–1.1)
GFR SERPL CREATININE-BSD FRML MDRD: 31 ML/MIN/1.73M2
GLUCOSE BLD-MCNC: 110 MG/DL (ref 70–125)
POTASSIUM BLD-SCNC: 4.5 MMOL/L (ref 3.5–5)
SODIUM SERPL-SCNC: 139 MMOL/L (ref 136–145)

## 2020-10-26 PROCEDURE — 99309 SBSQ NF CARE MODERATE MDM 30: CPT | Performed by: NURSE PRACTITIONER

## 2020-10-26 ASSESSMENT — MIFFLIN-ST. JEOR: SCORE: 1892.6

## 2020-10-26 NOTE — LETTER
10/26/2020        RE: Mila Kumar  [address]  [address]  [facility] [address]        [facility]  [place] [facility] Record Number: [id number]  Place of Service where encounter took place: [facility] ([facility]) [[id number]]  Chief Complaint   Patient presents with     RECHECK       HPI:    Mila Kumar is a 65 year old (1955), who is being seen today for an episodic care visit. HPI information obtained from: facility chart records, facility staff, patient report and [facility] chart review.    Ms. Kumar was visited today while in her room while sitting at the edge of the bed. Reports that she feels comfortable at this time but had pain last evening in which she took a dilaudid for and that helped. She had a BM yesterday. Eating well. Denies shortness of breath, wearing oxygen at night only, has a CPAP at home.     Past Medical and Surgical History reviewed in Epic today.    MEDICATIONS:    Current Outpatient Medications   Medication Sig Dispense Refill     acetaminophen-codeine (TYLENOL #3) 300-30 MG tablet Take 1 tablet by mouth every 6 hours as needed for severe pain On Hold 10 tablet 0     albuterol (PROAIR HFA/PROVENTIL HFA/VENTOLIN HFA) 108 (90 Base) MCG/ACT inhaler Inhale 2 puffs into the lungs every 6 hours       albuterol (PROVENTIL) (2.5 MG/3ML) 0.083% neb solution Take 2.5 mg by nebulization every 4 hours as needed for shortness of breath / dyspnea or wheezing       allopurinol (ZYLOPRIM) 300 MG tablet Take 300 mg by mouth every morning       ALPRAZolam (XANAX) 0.5 MG tablet Take 1 tablet (0.5 mg) by mouth 3 times daily as needed for anxiety 5 tablet 0     amLODIPine (NORVASC) 2.5 MG tablet Take 1 tablet (2.5 mg) by mouth every morning Hold for SBP <110       aspirin 81 MG EC tablet Take 81 mg by mouth every morning       azelastine (ASTELIN) 0.1 % nasal spray Spray 1 spray into both nostrils daily as needed for rhinitis or allergies        calcium citrate (CITRACAL) 950 MG tablet Take 0.5 tablets by mouth 2 times daily       cloNIDine (CATAPRES) 0.1 MG tablet Take 1 tablet (0.1 mg) by mouth 2 times daily (in addition to 2 X 0.1 mg bedtime dose)       cyanocobalamin (CYANOCOBALAMIN) 1000 MCG/ML injection Inject 1 mL into the muscle every 30 days       DULoxetine (CYMBALTA) 60 MG capsule Take 60 mg by mouth 2 times daily       famotidine (PEPCID) 20 MG tablet Take 20 mg by mouth 2 times daily as needed       Ferrous Gluconate 324 (37.5 Fe) MG TABS Take 1 tablet by mouth At Bedtime       fexofenadine (ALLEGRA) 180 MG tablet Take 180 mg by mouth daily as needed for allergies       fluticasone (FLONASE) 50 MCG/ACT nasal spray Spray 2 sprays into both nostrils daily as needed for rhinitis or allergies       fluticasone (FLOVENT HFA) 220 MCG/ACT inhaler Inhale 1 puff into the lungs daily as needed       gabapentin (NEURONTIN) 300 MG capsule Take 900 mg by mouth At Bedtime (3 X 300mg)       HYDROmorphone (DILAUDID) 2 MG tablet Take 1 tablet (2 mg) by mouth every 6 hours as needed for pain 30 tablet 0     lactase (LACTAID) 3000 UNIT tablet Take 9,000 Units by mouth 3 times daily as needed for indigestion       levothyroxine (SYNTHROID/LEVOTHROID) 112 MCG tablet Take 112 mcg by mouth every morning       lidocaine (LIDODERM) 5 % patch Place 1 patch onto the skin daily as needed for moderate pain To prevent lidocaine toxicity, patient should be patch free for 12 hrs daily.       loperamide (IMODIUM A-D) 2 MG tablet Take 2-4 mg by mouth 4 times daily as needed for diarrhea (max 8 tablets/24 hours)       melatonin 5 MG tablet Take 5 mg by mouth nightly as needed for sleep       methocarbamol (ROBAXIN) 500 MG tablet Take 500 mg by mouth 2 times daily as needed for muscle spasms       metoprolol succinate ER (TOPROL-XL) 50 MG 24 hr tablet Take 50 mg by mouth At Bedtime       Multiple Vitamins-Minerals (WOMENS MULTI VITAMIN & MINERAL) TABS Take 1 tablet by mouth  "every morning       ondansetron (ZOFRAN) 4 MG tablet Take 4 mg by mouth every 8 hours as needed for nausea       rosuvastatin (CRESTOR) 20 MG tablet Take 20 mg by mouth every morning       sennosides (SENOKOT) 8.6 MG tablet Take 2 tablets by mouth daily as needed for constipation       trolamine salicylate (ASPERCREME) 10 % external cream Apply topically 2 times daily as needed for moderate pain       trospium (SANCTURA) 20 MG tablet Take 20 mg by mouth 2 times daily       vitamin B6 (PYRIDOXINE) 50 MG TABS Take 50 mg by mouth 3 times daily       vitamin D3 (CHOLECALCIFEROL) 50 mcg (2000 units) tablet Take 1 tablet by mouth every morning       REVIEW OF SYSTEMS:  4 point ROS including Respiratory, CV, GI and , other than that noted in the HPI,  is negative    Objective:  /66   Pulse 85   Temp 98.7  F (37.1  C)   Resp 18   Ht 1.753 m (5' 9\")   Wt 128.8 kg (284 lb)   SpO2 98%   BMI 41.94 kg/m    Exam:  GENERAL APPEARANCE:  Alert, in no distress, pleasant, cooperative, oriented x 4  EYES: no discharge or mattering on lids or lashes noted  ENT:  moist mucous membranes, hearing acuity intact  NECK: supple, symmetrical  RESP: no respiratory distress, Lung sounds clear, patient is on room air  CV: Edema none in bilateral lower extremities. VASCULAR: warm extremities without open areas.  ABDOMEN: obese.   M/S:   Gait and station ambulates independently with cane, sling present to right arm  SKIN:  Inspection and palpation of skin and subcutaneous tissue: skin warm, dry and intact without rashes  NEURO: no facial asymmetry, no speech deficits and able to follow directions, moves all extremities symmetrically  PSYCH:  insight and judgement intact, memory intact, affect and mood normal    Labs:    SODIUM   139  mmol/L     POTASSIUM   4.2  mmol/L     CHLORIDE   103  mmol/L    CO2   25  mmol/L    ANION GAP, CALCULATION   11  mmol/L  5-18    GLUCOSE   87  mg/dL      CALCIUM   9.6  mg/dL      BLOOD UREA NITROGEN "   34  mg/dL  8-22     CREATININE   1.84  mg/dL  0.60-1.10     GFR MDRD AF AMER   33  mL/min/1.73m2      ASSESSMENT/PLAN:  (Z96.611) S/P reverse total shoulder arthroplasty, right  (primary encounter diagnosis)  (M79.18) Myofascial pain syndrome  Comment: acute on chronic post surgery.  pain has been well controlled and using minimal dilaudid. Followed by  with PM&R and receives trigger point injections for pain syndrome. Motorize WC for distance and ambulates around her home is baseline. She has been walking up to 40 feet here at the TCU. Care conference held this morning and she will be discharging at the end of this week.   Plan:   -continue with Dilaudid to 2 mg q6h PRN   -Tylenol #3 on hold for TCU admission   -Vitamin D for bone health  -aspercreme  Topical BID  prn   -robaxin 500 mg po BID prn   -lidocaine patch daily prn   -gabapentin 900 mg po daily at HS   -duloxetine 60 mg po BID  -ASA restarted post surgery for DVT prophylaxis. Ortho stated 325 mg po daily. Was on 81 mg po daily. Will move back to 325 mg po daily and have ortho address at follow up on 10/23/20  -NWB on right arm and sling in place  -PT/OT     (I10) Essential hypertension  (I50.32) Chronic diastolic congestive heart failure (H)  Comment: BP on the lower side since admission to the TCU so last week clonidine was decreased 0.1 mg BID and amlodipine was decreased to 2.5 mg and blood pressure now better controlled in the 120s.   Plan:   -Norvasc 2.5 mg po daily and hold for SBP <110  -metoprolol ER 50 mg po daily  -clonidine 0.1 mg po BID. Hold for SBP <110  -was on ASA 81 mg po daily for lacunar infarcts. Held 7 days prior to surgery. Ortho put back at 325 mg po daily  -Daily weights/ VS and dose adjust antihypertensives prn   -BMP pending for today.     (N18.32) Stage 3b chronic kidney disease  Comment: chronic-baseline 1.3-1.5 but up to 1.87 above the baseline so likely dry therefore lasix has been on hold. There is no edema  present today.   Plan:   -renal dose medications, avoid nephrotoxins  -repeat BMP pending for today.      (D50.8) Other iron deficiency anemia  Comment: Vitamin B and iron deficiency. Baseline hemoglobin 10-11 and 9.0 here at [facility].  Receives cyanocobalamin injections every 30 days.  Plan:   -vitamin B6 50 mg po TID  -iron supplement daily at HS     (J45.20) Mild intermittent asthma, unspecified whether complicated  Comment: no wheezing today.   Plan:   -Proair 2 puff q6H  -albuterol nebs q4H prn   -fluticasone into lungs daily   -flonase daily prn   -fexofenadine daily prn   -astelin nasal daily prn   -supplemental oxygen prn days and use nights      (K58.9) Irritable bowel syndrome, unspecified type  (K59.03) Drug-induced constipation  Comment: concern post-surgery patient/supplemental iron. Did not want scheduled meds prior but she has not had a BM for several day so will schedule something at bedtime.   Plan:   --sennokot one tablet at HS  -Senokot 2 tabs by mouth daily prn   -monitor for bowel movement   -loperamide daily prn for diarrhea      (F41.9) Anxiety Disorder  Comment: Has followed with psychiatry at [facility] in the past for potential PTSD.  Anxiety now well controlled on Cymbalta, and PRN Xanax for panic attacks.   Plan:  -Xanax 0.5 mg po TID  -melatonin 5 mg po daily prn at HS for insomnia   -duloxetine 60 mg po BID    (R53.81) Physical deconditioning  Comment: secondary to recent hospitalization and underlying medical conditions.   Plan: PT/OT for strengthening. SW for discharge planning.     Electronically signed by:  CAROLINA Winkler CNP         Sincerely,        CAROLINA Winkler CNP

## 2020-10-26 NOTE — PROGRESS NOTES
[facility]  [place] [facility] Record Number: [id number]  Place of Service where encounter took place: [facility] ([facility]) [[id number]]  Chief Complaint   Patient presents with     RECHECK       HPI:    Mila Kumar is a 65 year old (1955), who is being seen today for an episodic care visit. HPI information obtained from: facility chart records, facility staff, patient report and [facility] chart review.    Ms. Kumar was visited today while in her room while sitting at the edge of the bed. Reports that she feels comfortable at this time but had pain last evening in which she took a dilaudid for and that helped. She had a BM yesterday. Eating well. Denies shortness of breath, wearing oxygen at night only, has a CPAP at home.     Past Medical and Surgical History reviewed in Epic today.    MEDICATIONS:    Current Outpatient Medications   Medication Sig Dispense Refill     acetaminophen-codeine (TYLENOL #3) 300-30 MG tablet Take 1 tablet by mouth every 6 hours as needed for severe pain On Hold 10 tablet 0     albuterol (PROAIR HFA/PROVENTIL HFA/VENTOLIN HFA) 108 (90 Base) MCG/ACT inhaler Inhale 2 puffs into the lungs every 6 hours       albuterol (PROVENTIL) (2.5 MG/3ML) 0.083% neb solution Take 2.5 mg by nebulization every 4 hours as needed for shortness of breath / dyspnea or wheezing       allopurinol (ZYLOPRIM) 300 MG tablet Take 300 mg by mouth every morning       ALPRAZolam (XANAX) 0.5 MG tablet Take 1 tablet (0.5 mg) by mouth 3 times daily as needed for anxiety 5 tablet 0     amLODIPine (NORVASC) 2.5 MG tablet Take 1 tablet (2.5 mg) by mouth every morning Hold for SBP <110       aspirin 81 MG EC tablet Take 81 mg by mouth every morning       azelastine (ASTELIN) 0.1 % nasal spray Spray 1 spray into both nostrils daily as needed for rhinitis or allergies       calcium citrate (CITRACAL) 950 MG tablet Take 0.5 tablets by mouth 2 times daily       cloNIDine (CATAPRES)  0.1 MG tablet Take 1 tablet (0.1 mg) by mouth 2 times daily (in addition to 2 X 0.1 mg bedtime dose)       cyanocobalamin (CYANOCOBALAMIN) 1000 MCG/ML injection Inject 1 mL into the muscle every 30 days       DULoxetine (CYMBALTA) 60 MG capsule Take 60 mg by mouth 2 times daily       famotidine (PEPCID) 20 MG tablet Take 20 mg by mouth 2 times daily as needed       Ferrous Gluconate 324 (37.5 Fe) MG TABS Take 1 tablet by mouth At Bedtime       fexofenadine (ALLEGRA) 180 MG tablet Take 180 mg by mouth daily as needed for allergies       fluticasone (FLONASE) 50 MCG/ACT nasal spray Spray 2 sprays into both nostrils daily as needed for rhinitis or allergies       fluticasone (FLOVENT HFA) 220 MCG/ACT inhaler Inhale 1 puff into the lungs daily as needed       gabapentin (NEURONTIN) 300 MG capsule Take 900 mg by mouth At Bedtime (3 X 300mg)       HYDROmorphone (DILAUDID) 2 MG tablet Take 1 tablet (2 mg) by mouth every 6 hours as needed for pain 30 tablet 0     lactase (LACTAID) 3000 UNIT tablet Take 9,000 Units by mouth 3 times daily as needed for indigestion       levothyroxine (SYNTHROID/LEVOTHROID) 112 MCG tablet Take 112 mcg by mouth every morning       lidocaine (LIDODERM) 5 % patch Place 1 patch onto the skin daily as needed for moderate pain To prevent lidocaine toxicity, patient should be patch free for 12 hrs daily.       loperamide (IMODIUM A-D) 2 MG tablet Take 2-4 mg by mouth 4 times daily as needed for diarrhea (max 8 tablets/24 hours)       melatonin 5 MG tablet Take 5 mg by mouth nightly as needed for sleep       methocarbamol (ROBAXIN) 500 MG tablet Take 500 mg by mouth 2 times daily as needed for muscle spasms       metoprolol succinate ER (TOPROL-XL) 50 MG 24 hr tablet Take 50 mg by mouth At Bedtime       Multiple Vitamins-Minerals (WOMENS MULTI VITAMIN & MINERAL) TABS Take 1 tablet by mouth every morning       ondansetron (ZOFRAN) 4 MG tablet Take 4 mg by mouth every 8 hours as needed for nausea        "rosuvastatin (CRESTOR) 20 MG tablet Take 20 mg by mouth every morning       sennosides (SENOKOT) 8.6 MG tablet Take 2 tablets by mouth daily as needed for constipation       trolamine salicylate (ASPERCREME) 10 % external cream Apply topically 2 times daily as needed for moderate pain       trospium (SANCTURA) 20 MG tablet Take 20 mg by mouth 2 times daily       vitamin B6 (PYRIDOXINE) 50 MG TABS Take 50 mg by mouth 3 times daily       vitamin D3 (CHOLECALCIFEROL) 50 mcg (2000 units) tablet Take 1 tablet by mouth every morning       REVIEW OF SYSTEMS:  4 point ROS including Respiratory, CV, GI and , other than that noted in the HPI,  is negative    Objective:  /66   Pulse 85   Temp 98.7  F (37.1  C)   Resp 18   Ht 1.753 m (5' 9\")   Wt 128.8 kg (284 lb)   SpO2 98%   BMI 41.94 kg/m    Exam:  GENERAL APPEARANCE:  Alert, in no distress, pleasant, cooperative, oriented x 4  EYES: no discharge or mattering on lids or lashes noted  ENT:  moist mucous membranes, hearing acuity intact  NECK: supple, symmetrical  RESP: no respiratory distress, Lung sounds clear, patient is on room air  CV: Edema none in bilateral lower extremities. VASCULAR: warm extremities without open areas.  ABDOMEN: obese.   M/S:   Gait and station ambulates independently with cane, sling present to right arm  SKIN:  Inspection and palpation of skin and subcutaneous tissue: skin warm, dry and intact without rashes  NEURO: no facial asymmetry, no speech deficits and able to follow directions, moves all extremities symmetrically  PSYCH:  insight and judgement intact, memory intact, affect and mood normal    Labs:    SODIUM   139  mmol/L     POTASSIUM   4.2  mmol/L     CHLORIDE   103  mmol/L    CO2   25  mmol/L    ANION GAP, CALCULATION   11  mmol/L  5-18    GLUCOSE   87  mg/dL      CALCIUM   9.6  mg/dL      BLOOD UREA NITROGEN   34  mg/dL  8-22     CREATININE   1.84  mg/dL  0.60-1.10     GFR MDRD AF AMER "   33  mL/min/1.73m2      ASSESSMENT/PLAN:  (Z96.611) S/P reverse total shoulder arthroplasty, right  (primary encounter diagnosis)  (M79.18) Myofascial pain syndrome  Comment: acute on chronic post surgery.  pain has been well controlled and using minimal dilaudid. Followed by  with PM&R and receives trigger point injections for pain syndrome. Motorize WC for distance and ambulates around her home is baseline. She has been walking up to 40 feet here at the TCU. Care conference held this morning and she will be discharging at the end of this week.   Plan:   -continue with Dilaudid to 2 mg q6h PRN   -Tylenol #3 on hold for TCU admission   -Vitamin D for bone health  -aspercreme  Topical BID  prn   -robaxin 500 mg po BID prn   -lidocaine patch daily prn   -gabapentin 900 mg po daily at HS   -duloxetine 60 mg po BID  -ASA restarted post surgery for DVT prophylaxis. Ortho stated 325 mg po daily. Was on 81 mg po daily. Will move back to 325 mg po daily and have ortho address at follow up on 10/23/20  -NWB on right arm and sling in place  -PT/OT     (I10) Essential hypertension  (I50.32) Chronic diastolic congestive heart failure (H)  Comment: BP on the lower side since admission to the TCU so last week clonidine was decreased 0.1 mg BID and amlodipine was decreased to 2.5 mg and blood pressure now better controlled in the 120s.   Plan:   -Norvasc 2.5 mg po daily and hold for SBP <110  -metoprolol ER 50 mg po daily  -clonidine 0.1 mg po BID. Hold for SBP <110  -was on ASA 81 mg po daily for lacunar infarcts. Held 7 days prior to surgery. Ortho put back at 325 mg po daily  -Daily weights/ VS and dose adjust antihypertensives prn   -BMP pending for today.     (N18.32) Stage 3b chronic kidney disease  Comment: chronic-baseline 1.3-1.5 but up to 1.87 above the baseline so likely dry therefore lasix has been on hold. There is no edema present today.   Plan:   -renal dose medications, avoid nephrotoxins  -repeat BMP  pending for today.      (D50.8) Other iron deficiency anemia  Comment: Vitamin B and iron deficiency. Baseline hemoglobin 10-11 and 9.0 here at [facility].  Receives cyanocobalamin injections every 30 days.  Plan:   -vitamin B6 50 mg po TID  -iron supplement daily at HS     (J45.20) Mild intermittent asthma, unspecified whether complicated  Comment: no wheezing today.   Plan:   -Proair 2 puff q6H  -albuterol nebs q4H prn   -fluticasone into lungs daily   -flonase daily prn   -fexofenadine daily prn   -astelin nasal daily prn   -supplemental oxygen prn days and use nights      (K58.9) Irritable bowel syndrome, unspecified type  (K59.03) Drug-induced constipation  Comment: concern post-surgery patient/supplemental iron. Did not want scheduled meds prior but she has not had a BM for several day so will schedule something at bedtime.   Plan:   --sennokot one tablet at HS  -Senokot 2 tabs by mouth daily prn   -monitor for bowel movement   -loperamide daily prn for diarrhea      (F41.9) Anxiety Disorder  Comment: Has followed with psychiatry at [facility] in the past for potential PTSD.  Anxiety now well controlled on Cymbalta, and PRN Xanax for panic attacks.   Plan:  -Xanax 0.5 mg po TID  -melatonin 5 mg po daily prn at HS for insomnia   -duloxetine 60 mg po BID    (R53.81) Physical deconditioning  Comment: secondary to recent hospitalization and underlying medical conditions.   Plan: PT/OT for strengthening. SW for discharge planning.     Electronically signed by:  CAROLINA Winkler CNP

## 2020-10-29 ENCOUNTER — NURSING HOME VISIT (OUTPATIENT)
Dept: GERIATRICS | Facility: CLINIC | Age: 65
End: 2020-10-29
Payer: COMMERCIAL

## 2020-10-29 VITALS
BODY MASS INDEX: 41.62 KG/M2 | SYSTOLIC BLOOD PRESSURE: 124 MMHG | HEART RATE: 98 BPM | OXYGEN SATURATION: 95 % | RESPIRATION RATE: 18 BRPM | HEIGHT: 69 IN | TEMPERATURE: 98.7 F | DIASTOLIC BLOOD PRESSURE: 74 MMHG | WEIGHT: 281 LBS

## 2020-10-29 DIAGNOSIS — M79.18 MYOFASCIAL PAIN SYNDROME: ICD-10-CM

## 2020-10-29 DIAGNOSIS — N18.32 STAGE 3B CHRONIC KIDNEY DISEASE (H): ICD-10-CM

## 2020-10-29 DIAGNOSIS — F41.9 ANXIETY DISORDER, UNSPECIFIED TYPE: ICD-10-CM

## 2020-10-29 DIAGNOSIS — R53.81 PHYSICAL DECONDITIONING: ICD-10-CM

## 2020-10-29 DIAGNOSIS — I50.32 CHRONIC DIASTOLIC CONGESTIVE HEART FAILURE (H): ICD-10-CM

## 2020-10-29 DIAGNOSIS — K59.03 DRUG-INDUCED CONSTIPATION: ICD-10-CM

## 2020-10-29 DIAGNOSIS — D50.8 OTHER IRON DEFICIENCY ANEMIA: ICD-10-CM

## 2020-10-29 DIAGNOSIS — K58.9 IRRITABLE BOWEL SYNDROME, UNSPECIFIED TYPE: ICD-10-CM

## 2020-10-29 DIAGNOSIS — J45.20 MILD INTERMITTENT ASTHMA, UNSPECIFIED WHETHER COMPLICATED: ICD-10-CM

## 2020-10-29 DIAGNOSIS — I10 ESSENTIAL HYPERTENSION: ICD-10-CM

## 2020-10-29 DIAGNOSIS — Z96.611 S/P REVERSE TOTAL SHOULDER ARTHROPLASTY, RIGHT: Primary | ICD-10-CM

## 2020-10-29 PROCEDURE — 99316 NF DSCHRG MGMT 30 MIN+: CPT | Performed by: NURSE PRACTITIONER

## 2020-10-29 RX ORDER — FUROSEMIDE 20 MG
20 TABLET ORAL DAILY
COMMUNITY
Start: 2020-10-29

## 2020-10-29 ASSESSMENT — MIFFLIN-ST. JEOR: SCORE: 1878.99

## 2020-10-29 NOTE — LETTER
10/29/2020        RE: Mila Kumar  [address]  [address]  [facility] [address]        [facility] DISCHARGE SUMMARY  PATIENT'S NAME: Mila Kumar  YOB: 1955  MEDICAL RECORD NUMBER: [id number]  Place of Service where encounter took place: [facility] ([facility]) [[id number]]    PRIMARY CARE PROVIDER AND CLINIC RESPONSIBLE AFTER TRANSFER: Coleen Sanabria MD, [facility] [address] / [place] [address]; Phone: [phone]; Non-[facility] Provider     Transferring providers: CAROLIAN Finnegan DO  Recent Hospitalization/ED: [facility] stay 10/13/20 to 10/17/20.  Date of SNF Admission: October / 17 / 2020  Date of SNF (anticipated) Discharge: {DATE/MONTH/YEAR:3907757}  Discharged to: {fgsdischargeto1:864482}  Cognitive Scores: {fgscog1:588806}  Physical Function: {fgsphysicalfunction:043968}  DME: {fgsdmedc:231716}    CODE STATUS/ADVANCE DIRECTIVES DISCUSSION: CPR/Full Code   ALLERGIES: Amoxicillin-pot clavulanate, Contrast dye, Diagnostic x-ray materials, Salsalate, and Sulfa drugs    DISCHARGE DIAGNOSIS/NURSING FACILITY COURSE:   Ms. Kumar is a 65 year old female with a medical history of hypertension, GERD, anemia secondary to iron and B12 deficiencies,  mild intermittent asthma, stage III CKD, NEDRA, diastolic CHF and history of lacunar infarction without residual deficit, chronic pain. She underwent an Elective Right Reverse Total Shoulder on 10/13/20 with Dr. Pacheco for complete rotator cuff tear. She tolerated the procedure well and was admitted to [facility] for nursing care and rehab.     (Z96.611) S/P reverse total shoulder arthroplasty, right   (M79.18) Myofascial pain syndrome  Comment: acute on chronic post surgery.  Followed by  with PM&R and receives trigger point injections for pain syndrome. Motorize WC for distance and ambulates around her home  is baseline. Has used minimal dilaudid so will discontinue this and she will use her tylenol #3s if needed at home.   Plan:   --Tylenol #3s for pain  -Vitamin D for bone health  -aspercreme  Topical BID  prn   -robaxin 500 mg po BID prn   -lidocaine patch daily prn   -gabapentin 900 mg po daily at HS   -duloxetine 60 mg po BID  -ASA 81 mg daily  -NWB on right arm and sling in place  -PT/OT while at home.      (I10) Essential hypertension  (I50.32) Chronic diastolic congestive heart failure (H)  Comment: BP on the lower side since admission to the TCU so last week clonidine was decreased 0.1 mg BID, lasix has been on hold and amlodipine was decreased to 2.5 mg and blood pressure now better controlled in the 120s. Has mild lower extremity edema so will restart furosemide.   Plan:   -Norvasc 2.5 mg po daily   -metoprolol ER 50 mg po daily  -clonidine 0.1 mg po BID.   -was on ASA 81 mg po daily   -Daily weights/ VS and dose adjust antihypertensives prn     (N18.32) Stage 3b chronic kidney disease  Comment: chronic-baseline 1.3-1.5 but up to 1.87 above the baseline so likely dry therefore lasix was put on hold and Cr back down to 1.6 this week. .   Plan  -renal dose medications, avoid nephrotoxins  -BMP per PCP     (D50.8) Other iron deficiency anemia  Comment: Vitamin B and iron deficiency. Baseline hemoglobin 10-11 and 9.0 here at [facility].  Receives cyanocobalamin injections every 30 days.  Plan:   -vitamin B6 50 mg po TID  -iron supplement daily at HS     (J45.20) Mild intermittent asthma, unspecified whether complicated  Comment: no wheezing today.   Plan:   -Proair 2 puff q6H  -albuterol nebs q4H prn   -fluticasone into lungs daily   -flonase daily prn   -fexofenadine daily prn   -astelin nasal daily prn   -supplemental oxygen prn days and use nights      (K58.9) Irritable bowel syndrome, unspecified type  (K59.03) Drug-induced constipation  Comment: having regular bowel movements.   Plan:   --sennokot one tablet at  HS  -Senokot 2 tabs by mouth daily prn   -monitor for bowel movement   -loperamide daily prn for diarrhea      (F41.9) Anxiety Disorder  Comment: Has followed with psychiatry at [facility] in the past for potential PTSD.  Anxiety now well controlled on Cymbalta, and PRN Xanax for panic attacks.   Plan:  -Xanax 0.5 mg po TID PRN (has not used here at [facility])  -melatonin 5 mg po daily prn at HS for insomnia   -duloxetine 60 mg po BID     (R53.81) Physical deconditioning  Comment: secondary to recent hospitalization and underlying medical conditions.   Plan: PT/OT for strengthening. SW for discharge planning.     Past Medical History:  has a past medical history of Abdominal hernia, Acne vulgaris, Allergic state, Anemia, Anorgasmia of female, Anxiety, B12 deficiency, Carpal tunnel syndrome, Charcot ankle, Charcot's joint disease due to secondary diabetes (H), CHF (congestive heart failure) (H), Chronic low back pain, Chronic, continuous use of opioids, Cubital tunnel syndrome, Diabetes (H), Displacement of lumbar intervertebral disc without myelopathy, Dizziness, DJD (degenerative joint disease), Dysphasia, Dysrhythmia, Eating disorder, Elevated PTHrP level, Encounter for long-term (current) use of medications, Enthesopathy of hip, Fatty liver, Ganglion cyst of wrist, Gastric bypass status for obesity (1994), GERD (gastroesophageal reflux disease), Gout, Gouty arthropathy, Hearing loss, History of diabetes mellitus, History of lumbar surgery, History of right salpingo-oophorectomy, HTN (hypertension), Hyperlipidemia, Hyperplastic polyp of intestine, Hypothyroidism, Hypoxemia, IBS (irritable bowel syndrome), IFG (impaired fasting glucose), Impingement syndrome of left shoulder, Ischemic cardiomyopathy, Lactose intolerance, Lacunar infarction (H), Lateral epicondylitis of right elbow, Learning disorder, LVH (left ventricular hypertrophy), Major depressive disorder, recurrent (H), Microalbuminuria, Migraine,  Mild intermittent asthma, Morbid obesity (H), Multiple lacunar infarcts (H), Myofascial pain syndrome, Nephrolithiasis, Neurogenic bladder, Neuropathy, peripheral, Nontoxic multinodular goiter, Onychomycosis, NEDRA (obstructive sleep apnea), Pap smear abnormality of cervix, PCOS (polycystic ovarian syndrome), Pes planus, Renal cyst, acquired, right, S/P gastric bypass, Simple chronic bronchitis (H), Sleep apnea, Spinal stenosis, lumbar region without neurogenic claudication, Stage III chronic kidney disease, Tenosynovitis, TMJ dysfunction, Tobacco dependence in remission, Trochanteric bursitis of left hip, and Vitamin D deficiency.    Discharge Medications:    Current Outpatient Medications   Medication Sig Dispense Refill     acetaminophen-codeine (TYLENOL #3) 300-30 MG tablet Take 1 tablet by mouth every 6 hours as needed for severe pain 10 tablet 0     furosemide (LASIX) 20 MG tablet Take 1 tablet (20 mg) by mouth daily       albuterol (PROAIR HFA/PROVENTIL HFA/VENTOLIN HFA) 108 (90 Base) MCG/ACT inhaler Inhale 2 puffs into the lungs every 6 hours       albuterol (PROVENTIL) (2.5 MG/3ML) 0.083% neb solution Take 2.5 mg by nebulization every 4 hours as needed for shortness of breath / dyspnea or wheezing       allopurinol (ZYLOPRIM) 300 MG tablet Take 300 mg by mouth every morning       ALPRAZolam (XANAX) 0.5 MG tablet Take 1 tablet (0.5 mg) by mouth 3 times daily as needed for anxiety 5 tablet 0     amLODIPine (NORVASC) 2.5 MG tablet Take 1 tablet (2.5 mg) by mouth every morning Hold for SBP <110       aspirin 81 MG EC tablet Take 81 mg by mouth every morning       azelastine (ASTELIN) 0.1 % nasal spray Spray 1 spray into both nostrils daily as needed for rhinitis or allergies       calcium citrate (CITRACAL) 950 MG tablet Take 0.5 tablets by mouth 2 times daily       cloNIDine (CATAPRES) 0.1 MG tablet Take 1 tablet (0.1 mg) by mouth 2 times daily (in addition to 2 X 0.1 mg bedtime dose)       cyanocobalamin  (CYANOCOBALAMIN) 1000 MCG/ML injection Inject 1 mL into the muscle every 30 days       DULoxetine (CYMBALTA) 60 MG capsule Take 60 mg by mouth 2 times daily       famotidine (PEPCID) 20 MG tablet Take 20 mg by mouth 2 times daily as needed       Ferrous Gluconate 324 (37.5 Fe) MG TABS Take 1 tablet by mouth At Bedtime       fexofenadine (ALLEGRA) 180 MG tablet Take 180 mg by mouth daily as needed for allergies       fluticasone (FLONASE) 50 MCG/ACT nasal spray Spray 2 sprays into both nostrils daily as needed for rhinitis or allergies       fluticasone (FLOVENT HFA) 220 MCG/ACT inhaler Inhale 1 puff into the lungs daily as needed       gabapentin (NEURONTIN) 300 MG capsule Take 900 mg by mouth At Bedtime (3 X 300mg)       lactase (LACTAID) 3000 UNIT tablet Take 9,000 Units by mouth 3 times daily as needed for indigestion       levothyroxine (SYNTHROID/LEVOTHROID) 112 MCG tablet Take 112 mcg by mouth every morning       lidocaine (LIDODERM) 5 % patch Place 1 patch onto the skin daily as needed for moderate pain To prevent lidocaine toxicity, patient should be patch free for 12 hrs daily.       loperamide (IMODIUM A-D) 2 MG tablet Take 2-4 mg by mouth 4 times daily as needed for diarrhea (max 8 tablets/24 hours)       melatonin 5 MG tablet Take 5 mg by mouth nightly as needed for sleep       methocarbamol (ROBAXIN) 500 MG tablet Take 500 mg by mouth 2 times daily as needed for muscle spasms       metoprolol succinate ER (TOPROL-XL) 50 MG 24 hr tablet Take 50 mg by mouth At Bedtime       Multiple Vitamins-Minerals (WOMENS MULTI VITAMIN & MINERAL) TABS Take 1 tablet by mouth every morning       ondansetron (ZOFRAN) 4 MG tablet Take 4 mg by mouth every 8 hours as needed for nausea       rosuvastatin (CRESTOR) 20 MG tablet Take 20 mg by mouth every morning       sennosides (SENOKOT) 8.6 MG tablet Take 2 tablets by mouth daily as needed for constipation       trolamine salicylate (ASPERCREME) 10 % external cream Apply  "topically 2 times daily as needed for moderate pain       trospium (SANCTURA) 20 MG tablet Take 20 mg by mouth 2 times daily       vitamin B6 (PYRIDOXINE) 50 MG TABS Take 50 mg by mouth 3 times daily       vitamin D3 (CHOLECALCIFEROL) 50 mcg (2000 units) tablet Take 1 tablet by mouth every morning       Medication Changes/Rationale:     As noted above.     Controlled medications sent with patient:   not applicable/none. She has T3s and xanax at home.      ROS:   10 point ROS of systems including Constitutional, Eyes, Respiratory, Cardiovascular, Gastroenterology, Genitourinary, Integumentary, Musculoskeletal, Psychiatric were all negative except for pertinent positives noted in my HPI.    Physical Exam:   Vitals: /74   Pulse 98   Temp 98.7  F (37.1  C)   Resp 18   Ht 1.753 m (5' 9\")   Wt 127.5 kg (281 lb)   SpO2 95%   BMI 41.50 kg/m    BMI= Body mass index is 41.5 kg/m .  GENERAL APPEARANCE:  Alert, in no distress, pleasant, cooperative, oriented x 4  EYES: no discharge or mattering on lids or lashes noted  ENT:  moist mucous membranes, hearing acuity intact  NECK: supple, symmetrical  RESP: no respiratory distress, Lung sounds clear, patient is on room air  CV: Edema trace in bilateral lower extremities. VASCULAR: warm extremities without open areas.  ABDOMEN: obese.   M/S:   Gait and station ambulates independently with cane, sling present to right arm  SKIN:  Inspection and palpation of skin and subcutaneous tissue: skin warm, dry and intact without rashes  NEURO: no facial asymmetry, no speech deficits and able to follow directions, moves all extremities symmetrically  PSYCH:  insight and judgement intact, memory intact, affect and mood normal    SNF labs:    SODIUM   139  mmol/L      POTASSIUM   4.2  mmol/L      CHLORIDE   103  mmol/L     CO2   25  mmol/L     ANION GAP, CALCULATION   11  mmol/L  5-18     GLUCOSE   87  mg/dL      CALCIUM   9.6  mg/dL      BLOOD UREA NITROGEN   34  mg/dL  8-22      " CREATININE   1.65  mg/dL  0.60-1.10      GFR MDRD AF AMER   33  mL/min/1.73m2    DISCHARGE PLAN:    Follow up labs: BMP due in one week to be drawn by home care with results to PCP    Medical Follow Up: Follow up with primary care provider in 1-2 weeks    MTM referral needed and placed by this provider: Nati    Current [facility] scheduled appointments: NA    Discharge Services: Home Care:  Occupational Therapy, Physical Therapy, Registered Nurse, Home Health Aide and From:  [facility]    Discharge Instructions Verbalized to Patient at Discharge:     None      TOTAL DISCHARGE TIME:   Greater than 30 minutes  Electronically signed by:  CAROLINA Winkler CNP     Documentation of Face to Face and Certification for Home Health Services    I certify that patient: Mila Kumar is under my care and that I, or a nurse practitioner or physician's assistant working with me, had a face-to-face encounter that meets the physician face-to-face encounter requirements with this patient on: 10/29/2020.    This encounter with the patient was in whole, or in part, for the following medical condition, which is the primary reason for home health care: right shoulder arthroplasty.    I certify that, based on my findings, the following services are medically necessary home health services: Nursing, Occupational Therapy and Physical Therapy.    My clinical findings support the need for the above services because: Nurse is needed: To assess blood pressure, edema, pain control, need for BMP after changes in medications or other medical regimen.., Occupational Therapy Services are needed to assess and treat cognitive ability and address ADL safety due to impairment in upper body strength and limited mobility of RUE. and Physical Therapy Services are needed to assess and treat the following functional impairments: decreased lower body strength at baseline, needed to get reacclamated to her home following surgery.    Further, I  certify that my clinical findings support that this patient is homebound (i.e. absences from home require considerable and taxing effort and are for medical reasons or [employer] or infrequently or of short duration when for other reasons) because: Requires assistance of another person or specialized equipment to access medical services because patient: Requires supervision of another for safe transfer...    Based on the above findings. I certify that this patient is confined to the home and needs intermittent skilled nursing care, physical therapy and/or speech therapy.  The patient is under my care, and I have initiated the establishment of the plan of care.  This patient will be followed by a physician who will periodically review the plan of care.  Physician/Provider to provide follow up care: Coleen Sanabria    Attending hospital physician (the Medicare certified [place] provider): Dr. Betsey Martinez  Physician Signature: See electronic signature associated with these discharge orders.  Date: 10/29/2020                    Sincerely,        CAROLINA Winkler CNP

## 2020-10-29 NOTE — PROGRESS NOTES
[place] [facility] DISCHARGE SUMMARY  PATIENT'S NAME: Mila Kumar  YOB: 1955  MEDICAL RECORD NUMBER: [id number]  Place of Service where encounter took place: [facility] ([facility]) [[id number]]    PRIMARY CARE PROVIDER AND CLINIC RESPONSIBLE AFTER TRANSFER: Coleen Sanabria MD, [facility] [address] [facility] [place] / [place] [address]; Phone: [phone]; Non-[facility] Provider     Transferring providers: CAROLINA Finnegan DO  Recent Hospitalization/ED: [facility] stay 10/13/20 to 10/17/20.  Date of SNF Admission: October / 17 / 2020  Date of SNF (anticipated) Discharge: October / 30 / 2020  Discharged to: previous independent home  Cognitive Scores: Short blessed: 2/28  Physical Function: abmulating 40-50 feet with SBA, extensive assist with upper body dressing and immobilizer, supervision with lower body dressing, set up needed for grooming and sponge bath, and set up for eating. She needs assist with all areas of self care and IADLs.      CODE STATUS/ADVANCE DIRECTIVES DISCUSSION: CPR/Full Code   ALLERGIES: Amoxicillin-pot clavulanate, Contrast dye, Diagnostic x-ray materials, Salsalate, and Sulfa drugs    DISCHARGE DIAGNOSIS/NURSING FACILITY COURSE:   Ms. Kumar is a 65 year old female with a medical history of hypertension, GERD, anemia secondary to iron and B12 deficiencies,  mild intermittent asthma, stage III CKD, NEDRA, diastolic CHF and history of lacunar infarction without residual deficit, chronic pain. She underwent an Elective Right Reverse Total Shoulder on 10/13/20 with Dr. Pacheco for complete rotator cuff tear. She tolerated the procedure well and was admitted to [facility] for nursing care and rehab.     (Z96.611) S/P reverse total shoulder arthroplasty, right   (M79.18) Myofascial pain syndrome  Comment: acute on chronic post surgery.  Followed by  with PM&R and receives trigger  point injections for pain syndrome. Motorize WC for distance and ambulates around her home is baseline. Has used minimal dilaudid so will discontinue this and she will use her tylenol #3s if needed at home.   Plan:   --Tylenol #3s for pain  -Vitamin D for bone health  -aspercreme  Topical BID  prn   -robaxin 500 mg po BID prn   -lidocaine patch daily prn   -gabapentin 900 mg po daily at HS   -duloxetine 60 mg po BID  -ASA 81 mg daily  -NWB on right arm and sling in place  -PT/OT while at home.      (I10) Essential hypertension  (I50.32) Chronic diastolic congestive heart failure (H)  Comment: BP on the lower side since admission to the TCU so last week clonidine was decreased 0.1 mg BID, lasix has been on hold and amlodipine was decreased to 2.5 mg and blood pressure now better controlled in the 120s. Has mild lower extremity edema so will restart furosemide.   Plan:   -Norvasc 2.5 mg po daily   -metoprolol ER 50 mg po daily  -clonidine 0.1 mg po BID.   -was on ASA 81 mg po daily   -Daily weights/ VS and dose adjust antihypertensives prn     (N18.32) Stage 3b chronic kidney disease  Comment: chronic-baseline 1.3-1.5 but up to 1.87 above the baseline so likely dry therefore lasix was put on hold and Cr back down to 1.6 this week. .   Plan  -renal dose medications, avoid nephrotoxins  -BMP per PCP     (D50.8) Other iron deficiency anemia  Comment: Vitamin B and iron deficiency. Baseline hemoglobin 10-11 and 9.0 here at [facility].  Receives cyanocobalamin injections every 30 days.  Plan:   -vitamin B6 50 mg po TID  -iron supplement daily at HS     (J45.20) Mild intermittent asthma, unspecified whether complicated  Comment: no wheezing today.   Plan:   -Proair 2 puff q6H  -albuterol nebs q4H prn   -fluticasone into lungs daily   -flonase daily prn   -fexofenadine daily prn   -astelin nasal daily prn   -supplemental oxygen prn days and use nights      (K58.9) Irritable bowel syndrome, unspecified type  (K59.03) Drug-induced  constipation  Comment: having regular bowel movements.   Plan:   --sennokot one tablet at HS  -Senokot 2 tabs by mouth daily prn   -monitor for bowel movement   -loperamide daily prn for diarrhea      (F41.9) Anxiety Disorder  Comment: Has followed with psychiatry at [facility] in the past for potential PTSD.  Anxiety now well controlled on Cymbalta, and PRN Xanax for panic attacks.   Plan:  -Xanax 0.5 mg po TID PRN (has not used here at [facility])  -melatonin 5 mg po daily prn at  for insomnia   -duloxetine 60 mg po BID     (R53.81) Physical deconditioning  Comment: secondary to recent hospitalization and underlying medical conditions.   Plan: PT/OT for strengthening. SW for discharge planning.     Past Medical History:  has a past medical history of Abdominal hernia, Acne vulgaris, Allergic state, Anemia, Anorgasmia of female, Anxiety, B12 deficiency, Carpal tunnel syndrome, Charcot ankle, Charcot's joint disease due to secondary diabetes (H), CHF (congestive heart failure) (H), Chronic low back pain, Chronic, continuous use of opioids, Cubital tunnel syndrome, Diabetes (H), Displacement of lumbar intervertebral disc without myelopathy, Dizziness, DJD (degenerative joint disease), Dysphasia, Dysrhythmia, Eating disorder, Elevated PTHrP level, Encounter for long-term (current) use of medications, Enthesopathy of hip, Fatty liver, Ganglion cyst of wrist, Gastric bypass status for obesity (1994), GERD (gastroesophageal reflux disease), Gout, Gouty arthropathy, Hearing loss, History of diabetes mellitus, History of lumbar surgery, History of right salpingo-oophorectomy, HTN (hypertension), Hyperlipidemia, Hyperplastic polyp of intestine, Hypothyroidism, Hypoxemia, IBS (irritable bowel syndrome), IFG (impaired fasting glucose), Impingement syndrome of left shoulder, Ischemic cardiomyopathy, Lactose intolerance, Lacunar infarction (H), Lateral epicondylitis of right elbow, Learning disorder, LVH (left  ventricular hypertrophy), Major depressive disorder, recurrent (H), Microalbuminuria, Migraine, Mild intermittent asthma, Morbid obesity (H), Multiple lacunar infarcts (H), Myofascial pain syndrome, Nephrolithiasis, Neurogenic bladder, Neuropathy, peripheral, Nontoxic multinodular goiter, Onychomycosis, NEDRA (obstructive sleep apnea), Pap smear abnormality of cervix, PCOS (polycystic ovarian syndrome), Pes planus, Renal cyst, acquired, right, S/P gastric bypass, Simple chronic bronchitis (H), Sleep apnea, Spinal stenosis, lumbar region without neurogenic claudication, Stage III chronic kidney disease, Tenosynovitis, TMJ dysfunction, Tobacco dependence in remission, Trochanteric bursitis of left hip, and Vitamin D deficiency.    Discharge Medications:    Current Outpatient Medications   Medication Sig Dispense Refill     acetaminophen-codeine (TYLENOL #3) 300-30 MG tablet Take 1 tablet by mouth every 6 hours as needed for severe pain 10 tablet 0     furosemide (LASIX) 20 MG tablet Take 1 tablet (20 mg) by mouth daily       albuterol (PROAIR HFA/PROVENTIL HFA/VENTOLIN HFA) 108 (90 Base) MCG/ACT inhaler Inhale 2 puffs into the lungs every 6 hours       albuterol (PROVENTIL) (2.5 MG/3ML) 0.083% neb solution Take 2.5 mg by nebulization every 4 hours as needed for shortness of breath / dyspnea or wheezing       allopurinol (ZYLOPRIM) 300 MG tablet Take 300 mg by mouth every morning       ALPRAZolam (XANAX) 0.5 MG tablet Take 1 tablet (0.5 mg) by mouth 3 times daily as needed for anxiety 5 tablet 0     amLODIPine (NORVASC) 2.5 MG tablet Take 1 tablet (2.5 mg) by mouth every morning Hold for SBP <110       aspirin 81 MG EC tablet Take 81 mg by mouth every morning       azelastine (ASTELIN) 0.1 % nasal spray Spray 1 spray into both nostrils daily as needed for rhinitis or allergies       calcium citrate (CITRACAL) 950 MG tablet Take 0.5 tablets by mouth 2 times daily       cloNIDine (CATAPRES) 0.1 MG tablet Take 1 tablet (0.1  mg) by mouth 2 times daily (in addition to 2 X 0.1 mg bedtime dose)       cyanocobalamin (CYANOCOBALAMIN) 1000 MCG/ML injection Inject 1 mL into the muscle every 30 days       DULoxetine (CYMBALTA) 60 MG capsule Take 60 mg by mouth 2 times daily       famotidine (PEPCID) 20 MG tablet Take 20 mg by mouth 2 times daily as needed       Ferrous Gluconate 324 (37.5 Fe) MG TABS Take 1 tablet by mouth At Bedtime       fexofenadine (ALLEGRA) 180 MG tablet Take 180 mg by mouth daily as needed for allergies       fluticasone (FLONASE) 50 MCG/ACT nasal spray Spray 2 sprays into both nostrils daily as needed for rhinitis or allergies       fluticasone (FLOVENT HFA) 220 MCG/ACT inhaler Inhale 1 puff into the lungs daily as needed       gabapentin (NEURONTIN) 300 MG capsule Take 900 mg by mouth At Bedtime (3 X 300mg)       lactase (LACTAID) 3000 UNIT tablet Take 9,000 Units by mouth 3 times daily as needed for indigestion       levothyroxine (SYNTHROID/LEVOTHROID) 112 MCG tablet Take 112 mcg by mouth every morning       lidocaine (LIDODERM) 5 % patch Place 1 patch onto the skin daily as needed for moderate pain To prevent lidocaine toxicity, patient should be patch free for 12 hrs daily.       loperamide (IMODIUM A-D) 2 MG tablet Take 2-4 mg by mouth 4 times daily as needed for diarrhea (max 8 tablets/24 hours)       melatonin 5 MG tablet Take 5 mg by mouth nightly as needed for sleep       methocarbamol (ROBAXIN) 500 MG tablet Take 500 mg by mouth 2 times daily as needed for muscle spasms       metoprolol succinate ER (TOPROL-XL) 50 MG 24 hr tablet Take 50 mg by mouth At Bedtime       Multiple Vitamins-Minerals (WOMENS MULTI VITAMIN & MINERAL) TABS Take 1 tablet by mouth every morning       ondansetron (ZOFRAN) 4 MG tablet Take 4 mg by mouth every 8 hours as needed for nausea       rosuvastatin (CRESTOR) 20 MG tablet Take 20 mg by mouth every morning       sennosides (SENOKOT) 8.6 MG tablet Take 2 tablets by mouth daily as  "needed for constipation       trolamine salicylate (ASPERCREME) 10 % external cream Apply topically 2 times daily as needed for moderate pain       trospium (SANCTURA) 20 MG tablet Take 20 mg by mouth 2 times daily       vitamin B6 (PYRIDOXINE) 50 MG TABS Take 50 mg by mouth 3 times daily       vitamin D3 (CHOLECALCIFEROL) 50 mcg (2000 units) tablet Take 1 tablet by mouth every morning       Medication Changes/Rationale:     As noted above.     Controlled medications sent with patient:   not applicable/none. She has T3s and xanax at home.      ROS:   10 point ROS of systems including Constitutional, Eyes, Respiratory, Cardiovascular, Gastroenterology, Genitourinary, Integumentary, Musculoskeletal, Psychiatric were all negative except for pertinent positives noted in my HPI.    Physical Exam:   Vitals: /74   Pulse 98   Temp 98.7  F (37.1  C)   Resp 18   Ht 1.753 m (5' 9\")   Wt 127.5 kg (281 lb)   SpO2 95%   BMI 41.50 kg/m    BMI= Body mass index is 41.5 kg/m .  GENERAL APPEARANCE:  Alert, in no distress, pleasant, cooperative, oriented x 4  EYES: no discharge or mattering on lids or lashes noted  ENT:  moist mucous membranes, hearing acuity intact  NECK: supple, symmetrical  RESP: no respiratory distress, Lung sounds clear, patient is on room air  CV: Edema trace in bilateral lower extremities. VASCULAR: warm extremities without open areas.  ABDOMEN: obese.   M/S:   Gait and station ambulates independently with cane, sling present to right arm  SKIN:  Inspection and palpation of skin and subcutaneous tissue: skin warm, dry and intact without rashes  NEURO: no facial asymmetry, no speech deficits and able to follow directions, moves all extremities symmetrically  PSYCH:  insight and judgement intact, memory intact, affect and mood normal    SNF labs:    SODIUM   139  mmol/L      POTASSIUM   4.2  mmol/L      CHLORIDE   103  mmol/L     CO2   25  mmol/L     ANION GAP, CALCULATION "   11  mmol/L  5-18     GLUCOSE   87  mg/dL      CALCIUM   9.6  mg/dL      BLOOD UREA NITROGEN   34  mg/dL  8-22      CREATININE   1.65  mg/dL  0.60-1.10      GFR MDRD AF AMER   33  mL/min/1.73m2    DISCHARGE PLAN:    Follow up labs: BMP due in one week to be drawn by home care with results to PCP    Medical Follow Up: Follow up with primary care provider in 1-2 weeks    MTM referral needed and placed by this provider: No    Current [place] scheduled appointments: NA    Discharge Services: Home Care:  Occupational Therapy, Physical Therapy, Registered Nurse, Home Health Aide and From:  [facility]    Discharge Instructions Verbalized to Patient at Discharge:     None      TOTAL DISCHARGE TIME:   Greater than 30 minutes  Electronically signed by:  CAROLINA Winkler CNP     Documentation of Face to Face and Certification for Home Health Services    I certify that patient: Mila Kumar is under my care and that I, or a nurse practitioner or physician's assistant working with me, had a face-to-face encounter that meets the physician face-to-face encounter requirements with this patient on: 10/29/2020.    This encounter with the patient was in whole, or in part, for the following medical condition, which is the primary reason for home health care: right shoulder arthroplasty.    I certify that, based on my findings, the following services are medically necessary home health services: Nursing, Occupational Therapy and Physical Therapy.    My clinical findings support the need for the above services because: Nurse is needed: To assess blood pressure, edema, pain control, need for BMP after changes in medications or other medical regimen.., Occupational Therapy Services are needed to assess and treat cognitive ability and address ADL safety due to impairment in upper body strength and limited mobility of RUE. and Physical Therapy Services are needed to assess and treat the following functional impairments:  decreased lower body strength at baseline, needed to get reacclamated to her home following surgery.    Further, I certify that my clinical findings support that this patient is homebound (i.e. absences from home require considerable and taxing effort and are for medical reasons or Baptism services or infrequently or of short duration when for other reasons) because: Requires assistance of another person or specialized equipment to access medical services because patient: Requires supervision of another for safe transfer...    Based on the above findings. I certify that this patient is confined to the home and needs intermittent skilled nursing care, physical therapy and/or speech therapy.  The patient is under my care, and I have initiated the establishment of the plan of care.  This patient will be followed by a physician who will periodically review the plan of care.  Physician/Provider to provide follow up care: Coleen Sanabria    Attending hospital physician (the Medicare certified [facility] provider): Dr. Betsey Martinez  Physician Signature: See electronic signature associated with these discharge orders.  Date: 10/29/2020

## 2020-10-29 NOTE — LETTER
10/29/2020        RE: Mila Kumar  [address]  [address]  [facility] [address]        [facility] DISCHARGE SUMMARY  PATIENT'S NAME: Mila Kumar  YOB: 1955  MEDICAL RECORD NUMBER: [id number]  Place of Service where encounter took place: [facility] ([facility]) [[id number]]    PRIMARY CARE PROVIDER AND CLINIC RESPONSIBLE AFTER TRANSFER: Coleen Sanabria MD, [facility] [address] / [place] [address]; Phone: [phone]; Non-[facility] Provider     Transferring providers: CAROLINA Finnegan DO  Recent Hospitalization/ED: [facility] stay 10/13/20 to 10/17/20.  Date of SNF Admission: October / 17 / 2020  Date of SNF (anticipated) Discharge: October / 30 / 2020  Discharged to: previous independent home  Cognitive Scores: Short blessed: 2/28  Physical Function: abmulating 40-50 feet with SBA, extensive assist with upper body dressing and immobilizer, supervision with lower body dressing, set up needed for grooming and sponge bath, and set up for eating. She needs assist with all areas of self care and IADLs.      CODE STATUS/ADVANCE DIRECTIVES DISCUSSION: CPR/Full Code   ALLERGIES: Amoxicillin-pot clavulanate, Contrast dye, Diagnostic x-ray materials, Salsalate, and Sulfa drugs    DISCHARGE DIAGNOSIS/NURSING FACILITY COURSE:   Ms. Kumar is a 65 year old female with a medical history of hypertension, GERD, anemia secondary to iron and B12 deficiencies,  mild intermittent asthma, stage III CKD, NEDRA, diastolic CHF and history of lacunar infarction without residual deficit, chronic pain. She underwent an Elective Right Reverse Total Shoulder on 10/13/20 with Dr. Pacheco for complete rotator cuff tear. She tolerated the procedure well and was admitted to [facility] for nursing care and rehab.     (Z96.611) S/P reverse total shoulder arthroplasty, right   (M79.18) Myofascial  pain syndrome  Comment: acute on chronic post surgery.  Followed by  with PM&R and receives trigger point injections for pain syndrome. Motorize WC for distance and ambulates around her home is baseline. Has used minimal dilaudid so will discontinue this and she will use her tylenol #3s if needed at home.   Plan:   --Tylenol #3s for pain  -Vitamin D for bone health  -aspercreme  Topical BID  prn   -robaxin 500 mg po BID prn   -lidocaine patch daily prn   -gabapentin 900 mg po daily at HS   -duloxetine 60 mg po BID  -ASA 81 mg daily  -NWB on right arm and sling in place  -PT/OT while at home.      (I10) Essential hypertension  (I50.32) Chronic diastolic congestive heart failure (H)  Comment: BP on the lower side since admission to the TCU so last week clonidine was decreased 0.1 mg BID, lasix has been on hold and amlodipine was decreased to 2.5 mg and blood pressure now better controlled in the 120s. Has mild lower extremity edema so will restart furosemide.   Plan:   -Norvasc 2.5 mg po daily   -metoprolol ER 50 mg po daily  -clonidine 0.1 mg po BID.   -was on ASA 81 mg po daily   -Daily weights/ VS and dose adjust antihypertensives prn     (N18.32) Stage 3b chronic kidney disease  Comment: chronic-baseline 1.3-1.5 but up to 1.87 above the baseline so likely dry therefore lasix was put on hold and Cr back down to 1.6 this week. .   Plan  -renal dose medications, avoid nephrotoxins  -BMP per PCP     (D50.8) Other iron deficiency anemia  Comment: Vitamin B and iron deficiency. Baseline hemoglobin 10-11 and 9.0 here at [facility].  Receives cyanocobalamin injections every 30 days.  Plan:   -vitamin B6 50 mg po TID  -iron supplement daily at HS     (J45.20) Mild intermittent asthma, unspecified whether complicated  Comment: no wheezing today.   Plan:   -Proair 2 puff q6H  -albuterol nebs q4H prn   -fluticasone into lungs daily   -flonase daily prn   -fexofenadine daily prn   -astelin nasal daily prn   -supplemental  oxygen prn days and use nights      (K58.9) Irritable bowel syndrome, unspecified type  (K59.03) Drug-induced constipation  Comment: having regular bowel movements.   Plan:   --sennokot one tablet at HS  -Senokot 2 tabs by mouth daily prn   -monitor for bowel movement   -loperamide daily prn for diarrhea      (F41.9) Anxiety Disorder  Comment: Has followed with psychiatry at [facility] in the past for potential PTSD.  Anxiety now well controlled on Cymbalta, and PRN Xanax for panic attacks.   Plan:  -Xanax 0.5 mg po TID PRN (has not used here at [facility])  -melatonin 5 mg po daily prn at HS for insomnia   -duloxetine 60 mg po BID     (R53.81) Physical deconditioning  Comment: secondary to recent hospitalization and underlying medical conditions.   Plan: PT/OT for strengthening. SW for discharge planning.     Past Medical History:  has a past medical history of Abdominal hernia, Acne vulgaris, Allergic state, Anemia, Anorgasmia of female, Anxiety, B12 deficiency, Carpal tunnel syndrome, Charcot ankle, Charcot's joint disease due to secondary diabetes (H), CHF (congestive heart failure) (H), Chronic low back pain, Chronic, continuous use of opioids, Cubital tunnel syndrome, Diabetes (H), Displacement of lumbar intervertebral disc without myelopathy, Dizziness, DJD (degenerative joint disease), Dysphasia, Dysrhythmia, Eating disorder, Elevated PTHrP level, Encounter for long-term (current) use of medications, Enthesopathy of hip, Fatty liver, Ganglion cyst of wrist, Gastric bypass status for obesity (1994), GERD (gastroesophageal reflux disease), Gout, Gouty arthropathy, Hearing loss, History of diabetes mellitus, History of lumbar surgery, History of right salpingo-oophorectomy, HTN (hypertension), Hyperlipidemia, Hyperplastic polyp of intestine, Hypothyroidism, Hypoxemia, IBS (irritable bowel syndrome), IFG (impaired fasting glucose), Impingement syndrome of left shoulder, Ischemic cardiomyopathy, Lactose  intolerance, Lacunar infarction (H), Lateral epicondylitis of right elbow, Learning disorder, LVH (left ventricular hypertrophy), Major depressive disorder, recurrent (H), Microalbuminuria, Migraine, Mild intermittent asthma, Morbid obesity (H), Multiple lacunar infarcts (H), Myofascial pain syndrome, Nephrolithiasis, Neurogenic bladder, Neuropathy, peripheral, Nontoxic multinodular goiter, Onychomycosis, NEDRA (obstructive sleep apnea), Pap smear abnormality of cervix, PCOS (polycystic ovarian syndrome), Pes planus, Renal cyst, acquired, right, S/P gastric bypass, Simple chronic bronchitis (H), Sleep apnea, Spinal stenosis, lumbar region without neurogenic claudication, Stage III chronic kidney disease, Tenosynovitis, TMJ dysfunction, Tobacco dependence in remission, Trochanteric bursitis of left hip, and Vitamin D deficiency.    Discharge Medications:    Current Outpatient Medications   Medication Sig Dispense Refill     acetaminophen-codeine (TYLENOL #3) 300-30 MG tablet Take 1 tablet by mouth every 6 hours as needed for severe pain 10 tablet 0     furosemide (LASIX) 20 MG tablet Take 1 tablet (20 mg) by mouth daily       albuterol (PROAIR HFA/PROVENTIL HFA/VENTOLIN HFA) 108 (90 Base) MCG/ACT inhaler Inhale 2 puffs into the lungs every 6 hours       albuterol (PROVENTIL) (2.5 MG/3ML) 0.083% neb solution Take 2.5 mg by nebulization every 4 hours as needed for shortness of breath / dyspnea or wheezing       allopurinol (ZYLOPRIM) 300 MG tablet Take 300 mg by mouth every morning       ALPRAZolam (XANAX) 0.5 MG tablet Take 1 tablet (0.5 mg) by mouth 3 times daily as needed for anxiety 5 tablet 0     amLODIPine (NORVASC) 2.5 MG tablet Take 1 tablet (2.5 mg) by mouth every morning Hold for SBP <110       aspirin 81 MG EC tablet Take 81 mg by mouth every morning       azelastine (ASTELIN) 0.1 % nasal spray Spray 1 spray into both nostrils daily as needed for rhinitis or allergies       calcium citrate (CITRACAL) 950 MG  tablet Take 0.5 tablets by mouth 2 times daily       cloNIDine (CATAPRES) 0.1 MG tablet Take 1 tablet (0.1 mg) by mouth 2 times daily (in addition to 2 X 0.1 mg bedtime dose)       cyanocobalamin (CYANOCOBALAMIN) 1000 MCG/ML injection Inject 1 mL into the muscle every 30 days       DULoxetine (CYMBALTA) 60 MG capsule Take 60 mg by mouth 2 times daily       famotidine (PEPCID) 20 MG tablet Take 20 mg by mouth 2 times daily as needed       Ferrous Gluconate 324 (37.5 Fe) MG TABS Take 1 tablet by mouth At Bedtime       fexofenadine (ALLEGRA) 180 MG tablet Take 180 mg by mouth daily as needed for allergies       fluticasone (FLONASE) 50 MCG/ACT nasal spray Spray 2 sprays into both nostrils daily as needed for rhinitis or allergies       fluticasone (FLOVENT HFA) 220 MCG/ACT inhaler Inhale 1 puff into the lungs daily as needed       gabapentin (NEURONTIN) 300 MG capsule Take 900 mg by mouth At Bedtime (3 X 300mg)       lactase (LACTAID) 3000 UNIT tablet Take 9,000 Units by mouth 3 times daily as needed for indigestion       levothyroxine (SYNTHROID/LEVOTHROID) 112 MCG tablet Take 112 mcg by mouth every morning       lidocaine (LIDODERM) 5 % patch Place 1 patch onto the skin daily as needed for moderate pain To prevent lidocaine toxicity, patient should be patch free for 12 hrs daily.       loperamide (IMODIUM A-D) 2 MG tablet Take 2-4 mg by mouth 4 times daily as needed for diarrhea (max 8 tablets/24 hours)       melatonin 5 MG tablet Take 5 mg by mouth nightly as needed for sleep       methocarbamol (ROBAXIN) 500 MG tablet Take 500 mg by mouth 2 times daily as needed for muscle spasms       metoprolol succinate ER (TOPROL-XL) 50 MG 24 hr tablet Take 50 mg by mouth At Bedtime       Multiple Vitamins-Minerals (WOMENS MULTI VITAMIN & MINERAL) TABS Take 1 tablet by mouth every morning       ondansetron (ZOFRAN) 4 MG tablet Take 4 mg by mouth every 8 hours as needed for nausea       rosuvastatin (CRESTOR) 20 MG tablet Take  "20 mg by mouth every morning       sennosides (SENOKOT) 8.6 MG tablet Take 2 tablets by mouth daily as needed for constipation       trolamine salicylate (ASPERCREME) 10 % external cream Apply topically 2 times daily as needed for moderate pain       trospium (SANCTURA) 20 MG tablet Take 20 mg by mouth 2 times daily       vitamin B6 (PYRIDOXINE) 50 MG TABS Take 50 mg by mouth 3 times daily       vitamin D3 (CHOLECALCIFEROL) 50 mcg (2000 units) tablet Take 1 tablet by mouth every morning       Medication Changes/Rationale:     As noted above.     Controlled medications sent with patient:   not applicable/none. She has T3s and xanax at home.      ROS:   10 point ROS of systems including Constitutional, Eyes, Respiratory, Cardiovascular, Gastroenterology, Genitourinary, Integumentary, Musculoskeletal, Psychiatric were all negative except for pertinent positives noted in my HPI.    Physical Exam:   Vitals: /74   Pulse 98   Temp 98.7  F (37.1  C)   Resp 18   Ht 1.753 m (5' 9\")   Wt 127.5 kg (281 lb)   SpO2 95%   BMI 41.50 kg/m    BMI= Body mass index is 41.5 kg/m .  GENERAL APPEARANCE:  Alert, in no distress, pleasant, cooperative, oriented x 4  EYES: no discharge or mattering on lids or lashes noted  ENT:  moist mucous membranes, hearing acuity intact  NECK: supple, symmetrical  RESP: no respiratory distress, Lung sounds clear, patient is on room air  CV: Edema trace in bilateral lower extremities. VASCULAR: warm extremities without open areas.  ABDOMEN: obese.   M/S:   Gait and station ambulates independently with cane, sling present to right arm  SKIN:  Inspection and palpation of skin and subcutaneous tissue: skin warm, dry and intact without rashes  NEURO: no facial asymmetry, no speech deficits and able to follow directions, moves all extremities symmetrically  PSYCH:  insight and judgement intact, memory intact, affect and mood normal    SNF labs:    SODIUM   139  mmol/L      POTASSIUM "   4.2  mmol/L      CHLORIDE   103  mmol/L     CO2   25  mmol/L     ANION GAP, CALCULATION   11  mmol/L  5-18     GLUCOSE   87  mg/dL      CALCIUM   9.6  mg/dL      BLOOD UREA NITROGEN   34  mg/dL  8-22      CREATININE   1.65  mg/dL  0.60-1.10      GFR MDRD AF AMER   33  mL/min/1.73m2    DISCHARGE PLAN:    Follow up labs: BMP due in one week to be drawn by home care with results to PCP    Medical Follow Up: Follow up with primary care provider in 1-2 weeks    MTM referral needed and placed by this provider: Nati    Current [facility] scheduled appointments: NA    Discharge Services: Home Care:  Occupational Therapy, Physical Therapy, Registered Nurse, Home Health Aide and From:  [facility]    Discharge Instructions Verbalized to Patient at Discharge:     None      TOTAL DISCHARGE TIME:   Greater than 30 minutes  Electronically signed by:  CAROLINA Winkler CNP     Documentation of Face to Face and Certification for Home Health Services    I certify that patient: Mila Kumar is under my care and that I, or a nurse practitioner or physician's assistant working with me, had a face-to-face encounter that meets the physician face-to-face encounter requirements with this patient on: 10/29/2020.    This encounter with the patient was in whole, or in part, for the following medical condition, which is the primary reason for home health care: right shoulder arthroplasty.    I certify that, based on my findings, the following services are medically necessary home health services: Nursing, Occupational Therapy and Physical Therapy.    My clinical findings support the need for the above services because: Nurse is needed: To assess blood pressure, edema, pain control, need for BMP after changes in medications or other medical regimen.., Occupational Therapy Services are needed to assess and treat cognitive ability and address ADL safety due to impairment in upper body strength and limited mobility of RUE. and Physical  Therapy Services are needed to assess and treat the following functional impairments: decreased lower body strength at baseline, needed to get reacclamated to her home following surgery.    Further, I certify that my clinical findings support that this patient is homebound (i.e. absences from home require considerable and taxing effort and are for medical reasons or Confucianist services or infrequently or of short duration when for other reasons) because: Requires assistance of another person or specialized equipment to access medical services because patient: Requires supervision of another for safe transfer...    Based on the above findings. I certify that this patient is confined to the home and needs intermittent skilled nursing care, physical therapy and/or speech therapy.  The patient is under my care, and I have initiated the establishment of the plan of care.  This patient will be followed by a physician who will periodically review the plan of care.  Physician/Provider to provide follow up care: Coleen Sanabria    Attending hospital physician (the Medicare certified [facility] provider): Dr. Betsey Martinez  Physician Signature: See electronic signature associated with these discharge orders.  Date: 10/29/2020                    Sincerely,        CAROLINA Winkler CNP

## 2020-10-30 ENCOUNTER — TELEPHONE (OUTPATIENT)
Dept: GERIATRICS | Facility: CLINIC | Age: 65
End: 2020-10-30

## 2020-10-30 NOTE — TELEPHONE ENCOUNTER
Prior Authorization Retail Medication Request    Medication/Dose: Lidocaine (lidoderm) 5% patch  Rationale: Apply 1 patch daily prn - on for 12 hours off for 12 hours    Insurance Name: [facility] MEDICARE ADVANTAGE (primary)  Insurance ID: [id number]    Insurance Name: [employer] (secondary)  Insurance ID: [id number]    Pharmacy Information (if different than what is on RX)  Name: [facility] [place], [place] - [address] [address]  Phone: [phone]  Fax: [phone]

## 2020-10-30 NOTE — TELEPHONE ENCOUNTER
Central Prior Authorization Team   Phone: [phone]    PA Initiation    Medication:   Insurance Company: [employer] - Phone [phone] Fax [phone]  Pharmacy Filling the Rx: [id number] - [facility] [address] [facility]  Filling Pharmacy Phone: [phone]  Filling Pharmacy Fax: [phone]  Start Date: 10/30/2020

## 2020-10-30 NOTE — TELEPHONE ENCOUNTER
Prior Authorization Approval    Authorization Effective Date: 10/30/2020  Authorization Expiration Date: 12/31/2021  Medication: LIDOCAINE-APPROVED  Approved Dose/Quantity:    Reference #:     Insurance Company: [employer] - Phone [phone] Fax [phone]  Expected CoPay:       CoPay Card Available:      Foundation Assistance Needed:    Which Pharmacy is filling the prescription (Not needed for infusion/clinic administered): [id number] - [place], [place] - [address] [facility]  Pharmacy Notified: Yes  Patient Notified: Yes  **Instructed pharmacy to notify patient when script is ready to /ship.**

## (undated) DEVICE — MANIFOLD NEPTUNE 4 PORT 700-20

## (undated) DEVICE — Device

## (undated) DEVICE — DRSG TEGADERM 4X4 3/4" 1626

## (undated) DEVICE — SU VICRYL 2-0 CT-2 27" UND J269H

## (undated) DEVICE — COVER TABLE POLY 65X90" 8186

## (undated) DEVICE — ESU GROUND PAD UNIVERSAL W/O CORD

## (undated) DEVICE — SOL WATER IRRIG 1000ML BOTTLE 2F7114

## (undated) DEVICE — LINEN TOWEL PACK X5 5464

## (undated) DEVICE — SU VICRYL 1 MO-4 18" J702D

## (undated) DEVICE — GOWN IMPERVIOUS SPECIALTY XL/XLONG 39049

## (undated) DEVICE — SU FIBERWIRE 2 38" T-8 NDL  AR-7206

## (undated) DEVICE — DRSG XEROFORM 3X4" 8884432000

## (undated) DEVICE — GLOVE PROTEXIS W/NEU-THERA 8.0  2D73TE80

## (undated) DEVICE — SOL NACL 0.9% IRRIG 3000ML BAG 07972-08

## (undated) DEVICE — ESU ELEC BLADE 4" COATED

## (undated) DEVICE — ADH LIQUID MASTISOL TOPICAL VIAL 2-3ML 0523-48

## (undated) DEVICE — DRAIN HEMOVAC RESERVOIR KIT 10FR 1/8" MED 00-2550-002-10

## (undated) DEVICE — DRSG TEGADERM 6X8" 1628

## (undated) DEVICE — SU STRATAFIX MONOCRYL 2-0 SPIRAL SH 20CM SXMP1B409

## (undated) DEVICE — GLOVE PROTEXIS BLUE W/NEU-THERA 8.0  2D73EB80

## (undated) DEVICE — PREP CHLORAPREP 26ML TINTED ORANGE  260815

## (undated) DEVICE — ESU LIGASURE OPEN SEALER/DIVIDER SM JAW 16.5MM LF1212A

## (undated) DEVICE — SUCTION IRR SYSTEM W/O TIP INTERPULSE HANDPIECE 0210-100-000

## (undated) DEVICE — SUCTION TIP YANKAUER STR K87

## (undated) DEVICE — BLADE CLIPPER 4412A

## (undated) DEVICE — SPONGE RAY-TEC 4X8" 7318

## (undated) DEVICE — WRAP EZY SHOULDER 2703-06-GEL2

## (undated) DEVICE — BONE CLEANING TIP INTERPULSE  0210-010-000

## (undated) DEVICE — SYR 50ML CATH TIP W/O NDL 309620

## (undated) DEVICE — ESU ELEC BLADE 2.75" COATED/INSULATED E1455

## (undated) DEVICE — DRSG STERI STRIP 1/2X4" R1547

## (undated) DEVICE — PACK OPEN SHOULDER SOP15OCFSC

## (undated) RX ORDER — PROPOFOL 10 MG/ML
INJECTION, EMULSION INTRAVENOUS
Status: DISPENSED
Start: 2020-10-13

## (undated) RX ORDER — NEOSTIGMINE METHYLSULFATE 1 MG/ML
VIAL (ML) INJECTION
Status: DISPENSED
Start: 2020-10-13

## (undated) RX ORDER — GLYCOPYRROLATE 0.2 MG/ML
INJECTION, SOLUTION INTRAMUSCULAR; INTRAVENOUS
Status: DISPENSED
Start: 2020-10-13

## (undated) RX ORDER — FENTANYL CITRATE 50 UG/ML
INJECTION, SOLUTION INTRAMUSCULAR; INTRAVENOUS
Status: DISPENSED
Start: 2020-10-13